# Patient Record
Sex: MALE | Race: WHITE | Employment: OTHER | URBAN - METROPOLITAN AREA
[De-identification: names, ages, dates, MRNs, and addresses within clinical notes are randomized per-mention and may not be internally consistent; named-entity substitution may affect disease eponyms.]

---

## 2021-08-09 ENCOUNTER — HOSPITAL ENCOUNTER (INPATIENT)
Age: 86
LOS: 6 days | Discharge: HOME | DRG: 644 | End: 2021-08-15
Attending: INTERNAL MEDICINE | Admitting: INTERNAL MEDICINE
Payer: MEDICARE

## 2021-08-09 DIAGNOSIS — E87.1 HYPONATREMIA: Primary | ICD-10-CM

## 2021-08-09 PROCEDURE — 2060000000 HC ICU INTERMEDIATE R&B

## 2021-08-09 RX ORDER — ACETAMINOPHEN 325 MG/1
650 TABLET ORAL EVERY 6 HOURS PRN
Status: CANCELLED | OUTPATIENT
Start: 2021-08-09

## 2021-08-09 RX ORDER — FAMOTIDINE 20 MG/1
20 TABLET, FILM COATED ORAL DAILY
COMMUNITY

## 2021-08-09 RX ORDER — ATORVASTATIN CALCIUM 10 MG/1
10 TABLET, FILM COATED ORAL DAILY
COMMUNITY

## 2021-08-09 RX ORDER — SODIUM CHLORIDE 9 MG/ML
25 INJECTION, SOLUTION INTRAVENOUS PRN
Status: DISCONTINUED | OUTPATIENT
Start: 2021-08-09 | End: 2021-08-15 | Stop reason: HOSPADM

## 2021-08-09 RX ORDER — ONDANSETRON 2 MG/ML
4 INJECTION INTRAMUSCULAR; INTRAVENOUS EVERY 6 HOURS PRN
Status: DISCONTINUED | OUTPATIENT
Start: 2021-08-09 | End: 2021-08-15 | Stop reason: HOSPADM

## 2021-08-09 RX ORDER — ATORVASTATIN CALCIUM 40 MG/1
40 TABLET, FILM COATED ORAL NIGHTLY
Status: CANCELLED | OUTPATIENT
Start: 2021-08-09

## 2021-08-09 RX ORDER — CLONAZEPAM 0.5 MG/1
0.5 TABLET ORAL DAILY
COMMUNITY

## 2021-08-09 RX ORDER — TAMSULOSIN HYDROCHLORIDE 0.4 MG/1
0.4 CAPSULE ORAL DAILY
COMMUNITY

## 2021-08-09 RX ORDER — ONDANSETRON 4 MG/1
4 TABLET, ORALLY DISINTEGRATING ORAL EVERY 8 HOURS PRN
Status: CANCELLED | OUTPATIENT
Start: 2021-08-09

## 2021-08-09 RX ORDER — ONDANSETRON 2 MG/ML
4 INJECTION INTRAMUSCULAR; INTRAVENOUS EVERY 6 HOURS PRN
Status: CANCELLED | OUTPATIENT
Start: 2021-08-09

## 2021-08-09 RX ORDER — ACETAMINOPHEN 325 MG/1
650 TABLET ORAL EVERY 6 HOURS PRN
Status: DISCONTINUED | OUTPATIENT
Start: 2021-08-09 | End: 2021-08-15 | Stop reason: HOSPADM

## 2021-08-09 RX ORDER — SODIUM CHLORIDE 0.9 % (FLUSH) 0.9 %
5-40 SYRINGE (ML) INJECTION EVERY 12 HOURS SCHEDULED
Status: CANCELLED | OUTPATIENT
Start: 2021-08-09

## 2021-08-09 RX ORDER — SODIUM CHLORIDE 9 MG/ML
25 INJECTION, SOLUTION INTRAVENOUS PRN
Status: CANCELLED | OUTPATIENT
Start: 2021-08-09

## 2021-08-09 RX ORDER — ACETAMINOPHEN 650 MG/1
650 SUPPOSITORY RECTAL EVERY 6 HOURS PRN
Status: DISCONTINUED | OUTPATIENT
Start: 2021-08-09 | End: 2021-08-15 | Stop reason: HOSPADM

## 2021-08-09 RX ORDER — SODIUM CHLORIDE 0.9 % (FLUSH) 0.9 %
5-40 SYRINGE (ML) INJECTION PRN
Status: DISCONTINUED | OUTPATIENT
Start: 2021-08-09 | End: 2021-08-15 | Stop reason: HOSPADM

## 2021-08-09 RX ORDER — FLECAINIDE ACETATE 100 MG/1
150 TABLET ORAL 2 TIMES DAILY
COMMUNITY

## 2021-08-09 RX ORDER — SODIUM CHLORIDE 0.9 % (FLUSH) 0.9 %
5-40 SYRINGE (ML) INJECTION EVERY 12 HOURS SCHEDULED
Status: DISCONTINUED | OUTPATIENT
Start: 2021-08-09 | End: 2021-08-15 | Stop reason: HOSPADM

## 2021-08-09 RX ORDER — DILTIAZEM HYDROCHLORIDE 120 MG/1
120 CAPSULE, COATED, EXTENDED RELEASE ORAL DAILY
Status: ON HOLD | COMMUNITY
End: 2021-08-15 | Stop reason: SDUPTHER

## 2021-08-09 RX ORDER — SODIUM CHLORIDE 9 MG/ML
INJECTION, SOLUTION INTRAVENOUS CONTINUOUS
Status: DISCONTINUED | OUTPATIENT
Start: 2021-08-09 | End: 2021-08-10

## 2021-08-09 RX ORDER — ASPIRIN 81 MG/1
81 TABLET, CHEWABLE ORAL DAILY
Status: CANCELLED | OUTPATIENT
Start: 2021-08-10

## 2021-08-09 RX ORDER — ONDANSETRON 4 MG/1
4 TABLET, ORALLY DISINTEGRATING ORAL EVERY 8 HOURS PRN
Status: DISCONTINUED | OUTPATIENT
Start: 2021-08-09 | End: 2021-08-15 | Stop reason: HOSPADM

## 2021-08-09 RX ORDER — ACETAMINOPHEN 650 MG/1
650 SUPPOSITORY RECTAL EVERY 6 HOURS PRN
Status: CANCELLED | OUTPATIENT
Start: 2021-08-09

## 2021-08-09 RX ORDER — POLYETHYLENE GLYCOL 3350 17 G/17G
17 POWDER, FOR SOLUTION ORAL DAILY PRN
Status: DISCONTINUED | OUTPATIENT
Start: 2021-08-09 | End: 2021-08-15 | Stop reason: HOSPADM

## 2021-08-09 RX ORDER — SODIUM CHLORIDE 0.9 % (FLUSH) 0.9 %
5-40 SYRINGE (ML) INJECTION PRN
Status: CANCELLED | OUTPATIENT
Start: 2021-08-09

## 2021-08-09 RX ORDER — POLYETHYLENE GLYCOL 3350 17 G/17G
17 POWDER, FOR SOLUTION ORAL DAILY PRN
Status: CANCELLED | OUTPATIENT
Start: 2021-08-09

## 2021-08-09 RX ORDER — MIRTAZAPINE 15 MG/1
15 TABLET, FILM COATED ORAL NIGHTLY
COMMUNITY

## 2021-08-09 RX ORDER — SODIUM CHLORIDE 9 MG/ML
INJECTION, SOLUTION INTRAVENOUS CONTINUOUS
Status: CANCELLED | OUTPATIENT
Start: 2021-08-09

## 2021-08-09 ASSESSMENT — PAIN SCALES - GENERAL: PAINLEVEL_OUTOF10: 0

## 2021-08-10 LAB
ALBUMIN SERPL-MCNC: 3.6 G/DL (ref 3.5–5.2)
ALP BLD-CCNC: 66 U/L (ref 40–129)
ALT SERPL-CCNC: 9 U/L (ref 0–40)
ANION GAP SERPL CALCULATED.3IONS-SCNC: 8 MMOL/L (ref 7–16)
ANION GAP SERPL CALCULATED.3IONS-SCNC: 8 MMOL/L (ref 7–16)
ANION GAP SERPL CALCULATED.3IONS-SCNC: 9 MMOL/L (ref 7–16)
AST SERPL-CCNC: 15 U/L (ref 0–39)
BACTERIA: ABNORMAL /HPF
BASOPHILS ABSOLUTE: 0.01 E9/L (ref 0–0.2)
BASOPHILS RELATIVE PERCENT: 0.2 % (ref 0–2)
BILIRUB SERPL-MCNC: 0.4 MG/DL (ref 0–1.2)
BILIRUBIN URINE: NEGATIVE
BLOOD, URINE: ABNORMAL
BUN BLDV-MCNC: 15 MG/DL (ref 6–23)
BUN BLDV-MCNC: 18 MG/DL (ref 6–23)
BUN BLDV-MCNC: 19 MG/DL (ref 6–23)
CALCIUM SERPL-MCNC: 7.8 MG/DL (ref 8.6–10.2)
CALCIUM SERPL-MCNC: 8.3 MG/DL (ref 8.6–10.2)
CALCIUM SERPL-MCNC: 8.4 MG/DL (ref 8.6–10.2)
CHLORIDE BLD-SCNC: 91 MMOL/L (ref 98–107)
CHLORIDE BLD-SCNC: 94 MMOL/L (ref 98–107)
CHLORIDE BLD-SCNC: 95 MMOL/L (ref 98–107)
CLARITY: CLEAR
CO2: 24 MMOL/L (ref 22–29)
CO2: 25 MMOL/L (ref 22–29)
CO2: 25 MMOL/L (ref 22–29)
COLOR: YELLOW
CREAT SERPL-MCNC: 1.4 MG/DL (ref 0.7–1.2)
CREAT SERPL-MCNC: 1.5 MG/DL (ref 0.7–1.2)
CREAT SERPL-MCNC: 2 MG/DL (ref 0.7–1.2)
EOSINOPHILS ABSOLUTE: 0.07 E9/L (ref 0.05–0.5)
EOSINOPHILS RELATIVE PERCENT: 1.3 % (ref 0–6)
GFR AFRICAN AMERICAN: 38
GFR AFRICAN AMERICAN: 54
GFR AFRICAN AMERICAN: 58
GFR NON-AFRICAN AMERICAN: 32 ML/MIN/1.73
GFR NON-AFRICAN AMERICAN: 44 ML/MIN/1.73
GFR NON-AFRICAN AMERICAN: 48 ML/MIN/1.73
GLUCOSE BLD-MCNC: 104 MG/DL (ref 74–99)
GLUCOSE BLD-MCNC: 89 MG/DL (ref 74–99)
GLUCOSE BLD-MCNC: 93 MG/DL (ref 74–99)
GLUCOSE URINE: NEGATIVE MG/DL
HCT VFR BLD CALC: 30.7 % (ref 37–54)
HEMOGLOBIN: 10.7 G/DL (ref 12.5–16.5)
IMMATURE GRANULOCYTES #: 0.06 E9/L
IMMATURE GRANULOCYTES %: 1.1 % (ref 0–5)
KETONES, URINE: NEGATIVE MG/DL
LEUKOCYTE ESTERASE, URINE: NEGATIVE
LYMPHOCYTES ABSOLUTE: 0.65 E9/L (ref 1.5–4)
LYMPHOCYTES RELATIVE PERCENT: 12.2 % (ref 20–42)
MCH RBC QN AUTO: 29.9 PG (ref 26–35)
MCHC RBC AUTO-ENTMCNC: 34.9 % (ref 32–34.5)
MCV RBC AUTO: 85.8 FL (ref 80–99.9)
MONOCYTES ABSOLUTE: 0.88 E9/L (ref 0.1–0.95)
MONOCYTES RELATIVE PERCENT: 16.5 % (ref 2–12)
NEUTROPHILS ABSOLUTE: 3.67 E9/L (ref 1.8–7.3)
NEUTROPHILS RELATIVE PERCENT: 68.7 % (ref 43–80)
NITRITE, URINE: NEGATIVE
OSMOLALITY URINE: 168 MOSM/KG (ref 300–900)
OSMOLALITY: 267 MOSM/KG (ref 285–310)
PDW BLD-RTO: 11.9 FL (ref 11.5–15)
PH UA: 6 (ref 5–9)
PLATELET # BLD: 171 E9/L (ref 130–450)
PMV BLD AUTO: 8.4 FL (ref 7–12)
POTASSIUM REFLEX MAGNESIUM: 4.3 MMOL/L (ref 3.5–5)
POTASSIUM SERPL-SCNC: 4.5 MMOL/L (ref 3.5–5)
POTASSIUM SERPL-SCNC: 4.7 MMOL/L (ref 3.5–5)
PROTEIN UA: NEGATIVE MG/DL
RBC # BLD: 3.58 E12/L (ref 3.8–5.8)
RBC UA: ABNORMAL /HPF (ref 0–2)
SODIUM BLD-SCNC: 124 MMOL/L (ref 132–146)
SODIUM BLD-SCNC: 127 MMOL/L (ref 132–146)
SODIUM BLD-SCNC: 128 MMOL/L (ref 132–146)
SPECIFIC GRAVITY UA: <=1.005 (ref 1–1.03)
TOTAL PROTEIN: 5.7 G/DL (ref 6.4–8.3)
UROBILINOGEN, URINE: 0.2 E.U./DL
WBC # BLD: 5.3 E9/L (ref 4.5–11.5)
WBC UA: ABNORMAL /HPF (ref 0–5)

## 2021-08-10 PROCEDURE — 81001 URINALYSIS AUTO W/SCOPE: CPT

## 2021-08-10 PROCEDURE — 83930 ASSAY OF BLOOD OSMOLALITY: CPT

## 2021-08-10 PROCEDURE — 80053 COMPREHEN METABOLIC PANEL: CPT

## 2021-08-10 PROCEDURE — 2580000003 HC RX 258: Performed by: INTERNAL MEDICINE

## 2021-08-10 PROCEDURE — 99222 1ST HOSP IP/OBS MODERATE 55: CPT | Performed by: PSYCHIATRY & NEUROLOGY

## 2021-08-10 PROCEDURE — 2060000000 HC ICU INTERMEDIATE R&B

## 2021-08-10 PROCEDURE — 2500000003 HC RX 250 WO HCPCS: Performed by: PHYSICIAN ASSISTANT

## 2021-08-10 PROCEDURE — 6360000002 HC RX W HCPCS: Performed by: PHYSICIAN ASSISTANT

## 2021-08-10 PROCEDURE — 6370000000 HC RX 637 (ALT 250 FOR IP): Performed by: INTERNAL MEDICINE

## 2021-08-10 PROCEDURE — 85025 COMPLETE CBC W/AUTO DIFF WBC: CPT

## 2021-08-10 PROCEDURE — 83935 ASSAY OF URINE OSMOLALITY: CPT

## 2021-08-10 PROCEDURE — 80048 BASIC METABOLIC PNL TOTAL CA: CPT

## 2021-08-10 PROCEDURE — 36415 COLL VENOUS BLD VENIPUNCTURE: CPT

## 2021-08-10 PROCEDURE — 97165 OT EVAL LOW COMPLEX 30 MIN: CPT

## 2021-08-10 PROCEDURE — 97161 PT EVAL LOW COMPLEX 20 MIN: CPT

## 2021-08-10 RX ORDER — HYDRALAZINE HYDROCHLORIDE 20 MG/ML
10 INJECTION INTRAMUSCULAR; INTRAVENOUS EVERY 6 HOURS PRN
Status: DISCONTINUED | OUTPATIENT
Start: 2021-08-10 | End: 2021-08-10

## 2021-08-10 RX ORDER — LABETALOL HYDROCHLORIDE 5 MG/ML
10 INJECTION, SOLUTION INTRAVENOUS EVERY 4 HOURS PRN
Status: DISCONTINUED | OUTPATIENT
Start: 2021-08-10 | End: 2021-08-15 | Stop reason: HOSPADM

## 2021-08-10 RX ORDER — HYDRALAZINE HYDROCHLORIDE 20 MG/ML
10 INJECTION INTRAMUSCULAR; INTRAVENOUS EVERY 4 HOURS PRN
Status: DISCONTINUED | OUTPATIENT
Start: 2021-08-10 | End: 2021-08-11

## 2021-08-10 RX ORDER — ATORVASTATIN CALCIUM 10 MG/1
10 TABLET, FILM COATED ORAL DAILY
Status: DISCONTINUED | OUTPATIENT
Start: 2021-08-10 | End: 2021-08-15 | Stop reason: HOSPADM

## 2021-08-10 RX ORDER — TAMSULOSIN HYDROCHLORIDE 0.4 MG/1
0.4 CAPSULE ORAL DAILY
Status: DISCONTINUED | OUTPATIENT
Start: 2021-08-10 | End: 2021-08-15 | Stop reason: HOSPADM

## 2021-08-10 RX ORDER — DILTIAZEM HYDROCHLORIDE 120 MG/1
120 CAPSULE, COATED, EXTENDED RELEASE ORAL DAILY
Status: DISCONTINUED | OUTPATIENT
Start: 2021-08-10 | End: 2021-08-15 | Stop reason: HOSPADM

## 2021-08-10 RX ORDER — DEXTROSE MONOHYDRATE 50 MG/ML
INJECTION, SOLUTION INTRAVENOUS CONTINUOUS
Status: DISCONTINUED | OUTPATIENT
Start: 2021-08-10 | End: 2021-08-11

## 2021-08-10 RX ORDER — FAMOTIDINE 20 MG/1
20 TABLET, FILM COATED ORAL DAILY
Status: DISCONTINUED | OUTPATIENT
Start: 2021-08-10 | End: 2021-08-15 | Stop reason: HOSPADM

## 2021-08-10 RX ADMIN — APIXABAN 5 MG: 5 TABLET, FILM COATED ORAL at 11:46

## 2021-08-10 RX ADMIN — FLECAINIDE ACETATE 150 MG: 100 TABLET ORAL at 19:54

## 2021-08-10 RX ADMIN — ATORVASTATIN CALCIUM 10 MG: 10 TABLET, FILM COATED ORAL at 19:54

## 2021-08-10 RX ADMIN — APIXABAN 5 MG: 5 TABLET, FILM COATED ORAL at 19:54

## 2021-08-10 RX ADMIN — SODIUM CHLORIDE, PRESERVATIVE FREE 10 ML: 5 INJECTION INTRAVENOUS at 19:54

## 2021-08-10 RX ADMIN — SODIUM CHLORIDE: 9 INJECTION, SOLUTION INTRAVENOUS at 00:56

## 2021-08-10 RX ADMIN — FAMOTIDINE 20 MG: 20 TABLET ORAL at 11:46

## 2021-08-10 RX ADMIN — Medication 10 ML: at 17:13

## 2021-08-10 RX ADMIN — DILTIAZEM HYDROCHLORIDE 120 MG: 120 CAPSULE, COATED, EXTENDED RELEASE ORAL at 11:45

## 2021-08-10 RX ADMIN — LABETALOL HYDROCHLORIDE 10 MG: 5 INJECTION INTRAVENOUS at 21:43

## 2021-08-10 RX ADMIN — DEXTROSE MONOHYDRATE: 50 INJECTION, SOLUTION INTRAVENOUS at 10:29

## 2021-08-10 RX ADMIN — HYDRALAZINE HYDROCHLORIDE 10 MG: 20 INJECTION, SOLUTION INTRAMUSCULAR; INTRAVENOUS at 20:19

## 2021-08-10 RX ADMIN — FLECAINIDE ACETATE 150 MG: 100 TABLET ORAL at 11:47

## 2021-08-10 RX ADMIN — HYDRALAZINE HYDROCHLORIDE 10 MG: 20 INJECTION INTRAMUSCULAR; INTRAVENOUS at 23:53

## 2021-08-10 ASSESSMENT — PAIN SCALES - GENERAL
PAINLEVEL_OUTOF10: 0

## 2021-08-10 NOTE — PROGRESS NOTES
This patient is severely hyponatremic  Admission labs were not done  Apparently he is a difficult stick  Discussed with nursing - they are working on it now

## 2021-08-10 NOTE — CONSULTS
NEUROLOGY CONSULT NOTE       Requesting Physician: Anai Carlos MD     Reason for Consult:  Evaluate for \"possible CVA per outside hospital\"      IMPRESSION/PLAN:  Suspect patient's difficulties were all due to hyponatremia. Infarct on CT is likely old or an incidental finding. We, unfortunately, cannot find his records from [place], so I actually cannot review that CT, but he is going to get an MRI here and that will give us a better idea if this is a old or new stroke. Also depending on location, he may or may not need other work-up. Will follow up on the MRI results and make further plan at that time. History of Present Illness:  Jennifer Calvin is a 80 y.o. male admitted to [facility] on 8/9/2021. The patient was fine a few weeks back, even golfing, but more recently has been feeling generally weak and somewhat off balance. He was caring a box and a basket of peaches up some steps and missed a step and fell, injuring his right arm. Confusion as mentioned but the patient does not think he was confused. He saw his primary care doctor and I do not know if any labs were done as an outpatient. He went to [place] [facility] where he was found to have a sodium of 121. There was a possible basal ganglia stroke on CT and he was transferred here for further evaluation and treatment. Unfortunately we do not have his records from [facility].  I am sure they are floating around somewhere but the nurses have not seen them all day. Past Medical History:        Diagnosis Date    Atrial fibrillation ([address]     Hypertension     Pacemaker        No past surgical history on file. Social History:  Social History     Tobacco Use   Smoking Status Not on file     Social History     Substance and Sexual Activity   Alcohol Use None     Social History     Substance and Sexual Activity   Drug Use Not on file         Family History:   No family history on file.     Review of Systems:  CONSTITUTIONAL: negative for fever or recent illness  EYE: He has some difficulty describing visual changes recently. May be catching something out of the corner of his eye like a flash. He had a hard time better explaining this. ENT:  negative for dysphagia  RESPIRATORY:  negative for dyspnea  CARDIOVASCULAR:  negative for chest pain  GASTROINTESTINAL:  negative for nausea  HEMATOLOGIC/LYMPHATIC:  negative for unusual bleeding  MUSCULOSKELETAL: Right arm pain from a fall in arms had swollen up  PSYCH: He states he is on Remeron to help him sleep  SKIN: Considerable bruising of the right arm where he fell  GENITOURINARY: negative for dysuria  NEUROLOGIC: The patient feels generally weak. May be a little weaker on the left than the right. Allergies:    No Known Allergies     Current Medications:   dextrose 5 % solution, Continuous  apixaban (ELIQUIS) tablet 5 mg, BID  atorvastatin (LIPITOR) tablet 10 mg, Daily  dilTIAZem (CARDIZEM CD) extended release capsule 120 mg, Daily  famotidine (PEPCID) tablet 20 mg, Daily  flecainide (TAMBOCOR) tablet 150 mg, BID  tamsulosin (FLOMAX) capsule 0.4 mg, Daily  sodium chloride flush 0.9 % injection 5-40 mL, 2 times per day  sodium chloride flush 0.9 % injection 5-40 mL, PRN  0.9 % sodium chloride infusion, PRN  ondansetron (ZOFRAN-ODT) disintegrating tablet 4 mg, Q8H PRN   Or  ondansetron (ZOFRAN) injection 4 mg, Q6H PRN  polyethylene glycol (GLYCOLAX) packet 17 g, Daily PRN  acetaminophen (TYLENOL) tablet 650 mg, Q6H PRN   Or  acetaminophen (TYLENOL) suppository 650 mg, Q6H PRN       Medications Prior to Admission: atorvastatin (LIPITOR) 10 MG tablet, Take 10 mg by mouth daily  dilTIAZem (CARDIZEM CD) 120 MG extended release capsule, Take 120 mg by mouth daily  flecainide (TAMBOCOR) 100 MG tablet, Take 150 mg by mouth 2 times daily  mirtazapine (REMERON) 15 MG tablet, Take 15 mg by mouth nightly  clonazePAM (KLONOPIN) 0.5 MG tablet, Take 0.5 mg by mouth daily.   apixaban (ELIQUIS) 5 MG TABS tablet, Take 5 mg by mouth 2 times daily  famotidine (PEPCID) 20 MG tablet, Take 20 mg by mouth daily  tamsulosin (FLOMAX) 0.4 MG capsule, Take 0.4 mg by mouth daily    Physical Exam:  BP (!) 160/98   Pulse 74   Temp 98.2 °F (36.8 °C) (Oral)   Resp 18   Ht 6' (1.829 m)   Wt 212 lb 4.8 oz (96.3 kg)   SpO2 97%   BMI 28.79 kg/m²  I Body mass index is 28.79 kg/m². I   Wt Readings from Last 1 Encounters:   08/09/21 212 lb 4.8 oz (96.3 kg)        GENERAL: he is in no apparent distress; seems younger than stated age  EYE:  Fundi not examined due to the Covid-19 outbreak  CARDIOVASCULAR:  Heart regular rate and rhythm. Carotids not well auscultated due to patient talking  NEUROLOGIC:  Level of Alertness: alert  Orientation: oriented to person, place and time  Memory and Fund of Knowledge:  Normal; he is able to relate current events in great detail  Attention/Concentration: normal  Language: no aphasia  Cranial Nerves: pupils are equal; extraocular muscles intact; facial strength and sensation are intact; hearing is intact to soft voice; the palate raises midline, and the tongue protrudes midline; shoulder shrug is symmetric  Motor Exam: Normal tone in all extremities. No pronator drift. Strength is MRC grade 5 in all extremities bilaterally.   Sensory: Sensory symmetric to light touch  Coordination: Cerebellar function is intact for the nose-finger-nose and heel-to-shin maneuvers   Deep Tendon Reflexes: +1/4  Plantar Responses:  Downgoing  Abnormal movements: none  Station and gait: I did not get him up due  time constraints but he was able to walk with physical therapy with the standby assistance    Diagnostics:  CBC:   Lab Results   Component Value Date    WBC 5.3 08/10/2021    RBC 3.58 08/10/2021    HGB 10.7 08/10/2021    HCT 30.7 08/10/2021    MCV 85.8 08/10/2021    MCH 29.9 08/10/2021    MCHC 34.9 08/10/2021    RDW 11.9 08/10/2021     08/10/2021    MPV 8.4 08/10/2021     CMP:    Lab Results Component Value Date     08/10/2021    K 4.7 08/10/2021    K 4.3 08/10/2021    CL 94 08/10/2021    CO2 24 08/10/2021    BUN 18 08/10/2021    CREATININE 1.5 08/10/2021    GFRAA 54 08/10/2021    LABGLOM 44 08/10/2021    GLUCOSE 89 08/10/2021    PROT 5.7 08/10/2021    LABALBU 3.6 08/10/2021    CALCIUM 8.3 08/10/2021    BILITOT 0.4 08/10/2021    ALKPHOS 66 08/10/2021    AST 15 08/10/2021    ALT 9 08/10/2021         Electronically signed by Leonard Rincon DO on 8/10/2021 at 5:07 PM

## 2021-08-10 NOTE — H&P
[address] [facility]  History and Physical      CHIEF COMPLAINT:    Altered mental status     Patient of No primary care provider on file. presents with:  Altered mental status    History of Present Illness:   Patient is a 80year old male of Dr. Lena Biggs with past medical history of hypertension, atrial fibrillation, and status post pacemaker placement who presented to [facility] with altered mental status. Patient states he has been dizzy and confused for the past 2 to 4 weeks. Patient's daughter states that they have taken patient to PCPs office multiple times and work-up has been negative. CT head at [facility] showed questionable basal ganglia CVA. Patient found to be hyponatremic with sodium level of 121. Patient transferred to [facility] due to bed availability. Upon examination, patient is resting in bed with his daughter at bedside. Patient states that until 2 to 4 weeks ago he was in his normal state of health which included independence with all ADLs, golfing, and doing yard work. Patient states he is feeling much better today and back to baseline. REVIEW OF SYSTEMS:  Pertinent negatives are above in HPI. 10 point ROS otherwise negative. Past Medical History:   Diagnosis Date    Atrial fibrillation ([address]     Hypertension     Pacemaker          No past surgical history on file. Medications Prior to Admission:    Medications Prior to Admission: atorvastatin (LIPITOR) 10 MG tablet, Take 10 mg by mouth daily  dilTIAZem (CARDIZEM CD) 120 MG extended release capsule, Take 120 mg by mouth daily  flecainide (TAMBOCOR) 100 MG tablet, Take 150 mg by mouth 2 times daily  mirtazapine (REMERON) 15 MG tablet, Take 15 mg by mouth nightly  clonazePAM (KLONOPIN) 0.5 MG tablet, Take 0.5 mg by mouth daily.   apixaban (ELIQUIS) 5 MG TABS tablet, Take 5 mg by mouth 2 times daily  famotidine (PEPCID) 20 MG tablet, Take 20 mg by mouth daily  tamsulosin (FLOMAX) 0.4 MG capsule, 66 08/10/2021    AST 15 08/10/2021    ALT 9 08/10/2021         Telemetry:  I've personally reviewed the patient's telemetry:  Atrial paced    ASSESSMENT/PLAN:  Active Problems:    Hyponatremia  Resolved Problems:    * No resolved hospital problems. *    IV fluids  Monitor labs, BMP scheduled every 6 hours  Continue home meds  Monitor blood pressure  Hydralazine PRN  Supportive care    Medication for other comorbidities continue as appropriate dose adjustment as necessary. DVT prophylaxis  PT OT  Discharge planning  Case discussed with attending and agreed upon plan of care. Code status: Full  Requires inpatient level of care  BERNARDINO Fowler CNP    2:28 PM  8/10/2021       Currently completely asymptomatic and feels well  Na raysa to quickly and now is decreasing with d5 w  Will consult renal to mange hyponatremia       I personally saw, examined and provided care for the patient. Radiographs, labs and medication list were reviewed by me independently. The case was discussed in detail and plans for care were established. Review of BERNARDINO Fowler CNP, documentation was conducted and revisions were made as appropriate directly by me. I agree with the above documented exam, problem list, and plan of care.      Peterson Keen MD  9:51 PM  8/10/2021

## 2021-08-10 NOTE — PROGRESS NOTES
Labs took 8+ hours to come back. Eventually came back showing Na 128 up from 121 yesterday evening. Goal should be closer to 6 meq/L/day. Will switch fluids to D5W and start Q6H BMP to push sodium down or at least stabilize for rest of the day.

## 2021-08-10 NOTE — PROGRESS NOTES
Left message with Dr. Trina Webb regarding Na 128 and am labs, /90 and pt will need home meds ordered.  Mukesh Cortez RN

## 2021-08-10 NOTE — PROGRESS NOTES
Physical Therapy    Facility/Department: [address] INTERNAL MEDICINE 2  Initial Assessment    NAME: Arnoldo Wilson  : 1934  MRN: [id number]    Date of Service: 8/10/2021      Patient Diagnosis(es): There were no encounter diagnoses. has no past medical history on file. has no past surgical history on file. Evaluating Therapist: Elinor Canales PT      Room #:  8472/4550-  Diagnosis:  Hyponatremia [E87.1]   Questionable basal ganglia CVA  Precautions:  falls      Social:  Pt lives with wife in a 1 floor plan 3 steps to family room  Prior to admission independent without device     Initial Evaluation  Date: 8/10/21 Treatment      Short Term/ Long Term   Goals   Was pt agreeable to Eval/treatment? yes     Does pt have pain? No c/o pain     Bed Mobility  Rolling: independent  Supine to sit: independent  Sit to supine: NT  Scooting: independent  independnent   Transfers Sit to stand: SBA  Stand to sit: SBA  Stand pivot: SBA  independent   Ambulation    150 feet with no device with SBA  200 feet with no device independent   Stair Negotiation  Ascended and descended  NT   3 steps with 1 rail with supervision   LE strength     4-/5    4/5   balance      Fair+     AM-PAC Raw score                20/24         Pt is alert and grossly Oriented   LE ROM: WFL  Sensation: intact  Edema: none  Endurance: good       ASSESSMENT:    Pt displays functional ability as noted in the objective portion of this evaluation. Patient education  Pt educated on PT objectives    Patient response to education:   Pt verbalized understanding Pt demonstrated skill Pt requires further education in this area   yes           Comments:  No loss of balance or c/o dizziness during ambulation. Cues for attention to IV line    Pt's/ family goals   1.  To return home    Conditions Requiring Skilled Therapeutic Intervention:    [x]Decreased strength     []Decreased ROM  [x]Decreased functional mobility  [x]Decreased balance   []Decreased endurance   []Decreased posture  []Decreased sensation  []Decreased coordination   []Decreased vision  []Decreased safety awareness   []Increased pain       Patient and or family understand(s) diagnosis, prognosis, and plan of care. Prognosis is good for reaching above PT goals    PHYSICAL THERAPY PLAN OF CARE:    PT POC is established based on physician order and patient diagnosis     Referring provider/PT Order: Sarah Parisi MD/ PT eval and treat      Current Treatment Recommendations:     [x] Strengthening to improve independence with functional mobility   [] ROM to improve independence with functional mobility   [x] Balance Training to improve static/dynamic balance and to reduce fall risk  [x] Endurance Training to improve activity tolerance during functional mobility   [x] Transfer Training to improve safety and independence with all functional transfers   [x] Gait Training to improve gait mechanics, endurance and assess need for appropriate assistive device  [x] Stair Training in preparation for safe discharge home and/or into the community   [] Positioning to prevent skin breakdown and contractures  [x] Safety and Education Training   [x] Patient/Caregiver Education   [] HEP  [] Other     PT long term treatment goals are located in above grid    Frequency of treatments: 2-5x/week x 5 days. Time in  1040  Time out  1055        Evaluation Time includes thorough review of current medical information, gathering information on past medical history/social history and prior level of function, completion of standardized testing/informal observation of tasks, assessment of data and education on plan of care and goals.       CPT codes:  [x] Low Complexity PT evaluation 15746  [] Moderate Complexity PT evaluation 91994  [] High Complexity PT evaluation 99664  [] PT Re-evaluation 50607  [] Gait training 60989 minutes  [] Manual therapy 87623 minutes  [] Therapeutic activities 51793 minutes  [] Therapeutic exercises 50103 minutes  [] Neuromuscular reeducation 77374 minutes     [facility] PT [id number]

## 2021-08-10 NOTE — PROGRESS NOTES
Pt direct admit from [employer]. He has home medications in room with him, advised him not to take his own medications that we would provide them for him. Pt is AxO x4, he arrived with a skin tear that was covered with mepilex, patient uncomfortable with removing at this time.

## 2021-08-10 NOTE — PROGRESS NOTES
Occupational Therapy  OCCUPATIONAL THERAPY INITIAL EVALUATION  [facility] [address]    Date: 8/10/2021     Patient Name: Zoie Wallace  MRN: [id number]  : 1934  Room: [address] [place] [address]    Evaluating OT: Mariluz Mohamud, OTR/L - [id number]    Referring Provider: Anjana Nath MD; Charlanne Bence, APRN - CNP  Specific Provider Orders/Date: \"OT eval and treat\" - 2021 and 8/10/2021    Diagnosis: Hyponatremia [E87.1]      Pertinent Medical History: No past medical history on file. Precautions: fall risk    Assessment of Current Deficits:    [x] Functional mobility   [x]ADLs  [x] Strength               [x]Cognition   [x] Functional transfers   [x] IADLs         [x] Safety Awareness   [x]Endurance   [] Fine Coordination              [x] Balance      [] Vision/perception   [x]Sensation    []Gross Motor Coordination  [] ROM  [] Delirium                   [] Motor Control     OT PLAN OF CARE   OT POC is based on physician orders, patient diagnosis, and results of clinical assessment.   Frequency/Duration 2-5 days/week for 2 weeks PRN   Specific OT Treatment Interventions to Include:   * Instruction/training on adapted ADL techniques and AE recommendations to increase functional independence within precautions       * Training on energy conservation strategies, correct breathing pattern and techniques to improve independence/tolerance for self-care routine  * Functional transfer/mobility training/DME recommendations for increased independence, safety, and fall prevention  * Patient/Family education to increase follow through with safety techniques and functional independence  * Recommendation of environmental modifications for increased safety with functional transfers/mobility and ADLs  * Cognitive retraining/development of therapeutic activities to improve problem solving, judgement, memory, and attention for increased safety/participation in ADL/IADL tasks  * Therapeutic exercise to improve motor endurance, ROM, and functional strength for ADLs/functional transfers  * Therapeutic activities to facilitate/challenge dynamic balance, stand tolerance for increased safety and independence with ADLs  * Neuro-muscular re-education: facilitation of righting/equilibrium reactions, midline orientation, scapular stability/mobility, normalization of muscle tone, and facilitation of volitional active controled movement    Recommended Adaptive Equipment: TBD    Home Living: Patient lives with his wife in a one-floor home (three steps down to access family room area with TV and computer). Bathroom Setup: tub shower (no seat, no grab bars)     Prior Level of Function (PLOF): Patient reported that he was independent with ADLs, some IADLs, and functional mobility prior to this hospitalization. Driving: Yes    Pain Level: Patient denied experiencing pain . Cognition: Patient alert and oriented grossly. WFL command follow demonstrated. Decreased insight to current abilities/limitations demonstrated. Memory: WFL grossly  Sequencing: WFL grossly  Problem Solving: Fair  Judgement/Safety: Fair   Impulsivity demonstrated. Functional Assessment:  -PAC Daily Activity Raw Score: 19/24   Initial Eval Status  Date: 8/10/2021 Treatment Status  Date:  Short Term Goals = Long Term Goals   Feeding Independent  N/A   Grooming SBA for hand hygiene and hair combing while standing at sinkside.   Mod I / Arthur [place]  (seated/standing at sink)   UB Dressing Setup  Independent  (including item retrieval)   LB Dressing Min A  Mod I / Independent - with use of AE, as needed/appropriate   Bathing Min A  Mod I / Independent - with use of AE/DME, as needed/appropriate   Toileting CGA  Mod I / Independent   Bed Mobility  Supine-to-Sit: Independent     Functional Transfers Sit-to-Stand: SBA   from EOB  Independent   Functional Mobility SBA   (without device) for household+ distance within patient's room, bathroom, and hallway. Mod I / Independent with functional mobility (with device, as needed/appropriate) in order to maximize independence with ADLs/IADLs and other functional tasks. Balance Sitting: Good  (at EOB)  Standing: Fair+  (without device)  Good dynamic standing balance during completion of ADLs/IADLs and other functional tasks. Activity Tolerance Fair+  Patient will demonstrate Good understanding and consistent implementation of energy conservation techniques and work simplification techniques into ADL/IADL routines. Visual/  Perceptual WFL     N/A   B UE Strength 4-/5 grossly  Patient will demonstrate 4+/5 B UE strength in order to maximize independence with ADLs and functional transfers. Additional Long-Term Goal: Patient will increase functional independence to PLOF in order to allow patient to live in least restrictive environment. ROM: Additional Information:    R UE  WFL    L UE WFL      Hearing: WFL  Sensation: Patient denied experiencing numbness/tingling in B UEs. Tone: WFL  Edema: No    Comments: RN approved patient's participation in [address] [place] [address] activities. Upon arrival, patient supine in bed. At end of session, patient seated in bedside chair with call light and phone within reach and all lines and tubes intact. Patient would benefit from continued skilled OT to increase safety and independence with completion of ADL/IADL tasks for functional independence and quality of life. Rehab Potential: Good for established goals. Patient / Family Goal: No goal stated. Patient and/or family were instructed on functional diagnosis, prognosis/goals, and OT plan of care. Demonstrated Fair understanding.     Eval Complexity: Low    Time In: 1040  Time Out: 1055  Total Treatment Time: 0 minutes      Minutes Units   OT Eval Low 86045 15 1   OT Eval Medium 35696     OT Eval High 46593     OT Re-Eval [id number]     Therapeutic Ex 05076     Therapeutic Activities 27948     ADL/Self Care 29254     Orthotic Management 01880     Neuro Re-Ed 21447     Non-Billable Time N/A ---     Evaluation time includes thorough review of current medical information, gathering information on past medical history/social history and prior level of function, completion of standardized testing/informal observation of tasks, assessment of data, and education on plan of care and goals. Johanne Campos, OTR/L  License Number: [id number]

## 2021-08-10 NOTE — PROGRESS NOTES
Transfer from [facility] for frequent falls. AVSS except mildly hypertensive. Neuro exam nonfocal per [facility] ED PA. Exam reportedly unremarkable. Orthostatics not done. Found to have significant hyponatremia Na 121. Reportedly not on diuretics. Creatinine reportedly at baseline ~1.5. CT head apparently shows questionable basal ganglia CVA chronicity unclear. [facility] has no beds, so they are transferring to us.

## 2021-08-10 NOTE — PROGRESS NOTES
I briefly thought [facility] didn't have Neuro and asked [facility] to send patient to [place] instead  Almost immediately after hanging up, I discovered the [facility] in fact does have neuro  I made 5 separate attempts to call Stacie Madrigal back immediately to let them know that the patient CAN still go to [facility]  All phone calls were unsuccessful. I was left on hold interminably.   I spoke directly to [facility] ED to notify them of this issue, and that transfer to [facility] is STILL THE PLAN  I simply cannot reach the Stacie Madrigal anymore

## 2021-08-10 NOTE — CARE COORDINATION
Met w/ patient. Explained role of  and plan of care. Lives w/ wife in a 1 story house- 3 steps to entrance. Has cane. Independent PTA. Drives. PCP is Dr. Gerard Bunch and pharmacy is [facility]. PT am-pac 20. States frequent falls at home. Neuro to see. For MRI brain. Monitoring BMP q6hr. Plan remains to return home on discharge.  Will follow  Rosendo Sheets RN case manager

## 2021-08-11 ENCOUNTER — APPOINTMENT (OUTPATIENT)
Dept: CT IMAGING | Age: 86
DRG: 644 | End: 2021-08-11
Attending: INTERNAL MEDICINE
Payer: MEDICARE

## 2021-08-11 LAB
ALBUMIN SERPL-MCNC: 3.9 G/DL (ref 3.5–5.2)
ALP BLD-CCNC: 69 U/L (ref 40–129)
ALT SERPL-CCNC: 11 U/L (ref 0–40)
ANION GAP SERPL CALCULATED.3IONS-SCNC: 10 MMOL/L (ref 7–16)
ANION GAP SERPL CALCULATED.3IONS-SCNC: 11 MMOL/L (ref 7–16)
ANION GAP SERPL CALCULATED.3IONS-SCNC: 9 MMOL/L (ref 7–16)
AST SERPL-CCNC: 17 U/L (ref 0–39)
BASOPHILS ABSOLUTE: 0.02 E9/L (ref 0–0.2)
BASOPHILS RELATIVE PERCENT: 0.3 % (ref 0–2)
BILIRUB SERPL-MCNC: 0.5 MG/DL (ref 0–1.2)
BUN BLDV-MCNC: 20 MG/DL (ref 6–23)
BUN BLDV-MCNC: 21 MG/DL (ref 6–23)
BUN BLDV-MCNC: 25 MG/DL (ref 6–23)
CALCIUM SERPL-MCNC: 8.4 MG/DL (ref 8.6–10.2)
CALCIUM SERPL-MCNC: 8.9 MG/DL (ref 8.6–10.2)
CALCIUM SERPL-MCNC: 8.9 MG/DL (ref 8.6–10.2)
CHLORIDE BLD-SCNC: 93 MMOL/L (ref 98–107)
CHLORIDE URINE RANDOM: 41 MMOL/L
CO2: 22 MMOL/L (ref 22–29)
CO2: 24 MMOL/L (ref 22–29)
CO2: 25 MMOL/L (ref 22–29)
CREAT SERPL-MCNC: 1.7 MG/DL (ref 0.7–1.2)
CREATININE URINE: 213 MG/DL (ref 40–278)
CREATININE URINE: 214 MG/DL (ref 40–278)
EOSINOPHILS ABSOLUTE: 0.15 E9/L (ref 0.05–0.5)
EOSINOPHILS RELATIVE PERCENT: 2.2 % (ref 0–6)
GFR AFRICAN AMERICAN: 46
GFR NON-AFRICAN AMERICAN: 38 ML/MIN/1.73
GLUCOSE BLD-MCNC: 109 MG/DL (ref 74–99)
GLUCOSE BLD-MCNC: 110 MG/DL (ref 74–99)
GLUCOSE BLD-MCNC: 94 MG/DL (ref 74–99)
HCT VFR BLD CALC: 33.6 % (ref 37–54)
HEMOGLOBIN: 11.5 G/DL (ref 12.5–16.5)
IMMATURE GRANULOCYTES #: 0.06 E9/L
IMMATURE GRANULOCYTES %: 0.9 % (ref 0–5)
LYMPHOCYTES ABSOLUTE: 0.68 E9/L (ref 1.5–4)
LYMPHOCYTES RELATIVE PERCENT: 9.9 % (ref 20–42)
MCH RBC QN AUTO: 29.6 PG (ref 26–35)
MCHC RBC AUTO-ENTMCNC: 34.2 % (ref 32–34.5)
MCV RBC AUTO: 86.6 FL (ref 80–99.9)
MICROALBUMIN UR-MCNC: 45.1 MG/L
MICROALBUMIN/CREAT UR-RTO: 21.1 (ref 0–30)
MONOCYTES ABSOLUTE: 1.05 E9/L (ref 0.1–0.95)
MONOCYTES RELATIVE PERCENT: 15.3 % (ref 2–12)
NEUTROPHILS ABSOLUTE: 4.91 E9/L (ref 1.8–7.3)
NEUTROPHILS RELATIVE PERCENT: 71.4 % (ref 43–80)
OSMOLALITY URINE: 473 MOSM/KG (ref 300–900)
OSMOLALITY: 266 MOSM/KG (ref 285–310)
PDW BLD-RTO: 12.1 FL (ref 11.5–15)
PLATELET # BLD: 178 E9/L (ref 130–450)
PMV BLD AUTO: 8.2 FL (ref 7–12)
POTASSIUM SERPL-SCNC: 4.2 MMOL/L (ref 3.5–5)
POTASSIUM SERPL-SCNC: 4.4 MMOL/L (ref 3.5–5)
POTASSIUM SERPL-SCNC: 4.6 MMOL/L (ref 3.5–5)
POTASSIUM, UR: 60.5 MMOL/L
PROTEIN PROTEIN: 17 MG/DL (ref 0–12)
PROTEIN/CREAT RATIO: 0.1
PROTEIN/CREAT RATIO: 0.1 (ref 0–0.2)
RBC # BLD: 3.88 E12/L (ref 3.8–5.8)
SODIUM BLD-SCNC: 126 MMOL/L (ref 132–146)
SODIUM BLD-SCNC: 127 MMOL/L (ref 132–146)
SODIUM BLD-SCNC: 127 MMOL/L (ref 132–146)
SODIUM URINE: 51 MMOL/L
TOTAL PROTEIN: 6.2 G/DL (ref 6.4–8.3)
WBC # BLD: 6.9 E9/L (ref 4.5–11.5)

## 2021-08-11 PROCEDURE — 6370000000 HC RX 637 (ALT 250 FOR IP): Performed by: NURSE PRACTITIONER

## 2021-08-11 PROCEDURE — 6370000000 HC RX 637 (ALT 250 FOR IP): Performed by: INTERNAL MEDICINE

## 2021-08-11 PROCEDURE — 6360000002 HC RX W HCPCS

## 2021-08-11 PROCEDURE — 2580000003 HC RX 258: Performed by: INTERNAL MEDICINE

## 2021-08-11 PROCEDURE — 83930 ASSAY OF BLOOD OSMOLALITY: CPT

## 2021-08-11 PROCEDURE — 85025 COMPLETE CBC W/AUTO DIFF WBC: CPT

## 2021-08-11 PROCEDURE — 51798 US URINE CAPACITY MEASURE: CPT

## 2021-08-11 PROCEDURE — 80048 BASIC METABOLIC PNL TOTAL CA: CPT

## 2021-08-11 PROCEDURE — 2060000000 HC ICU INTERMEDIATE R&B

## 2021-08-11 PROCEDURE — 82436 ASSAY OF URINE CHLORIDE: CPT

## 2021-08-11 PROCEDURE — 84300 ASSAY OF URINE SODIUM: CPT

## 2021-08-11 PROCEDURE — 82570 ASSAY OF URINE CREATININE: CPT

## 2021-08-11 PROCEDURE — 80053 COMPREHEN METABOLIC PANEL: CPT

## 2021-08-11 PROCEDURE — 6360000002 HC RX W HCPCS: Performed by: FAMILY MEDICINE

## 2021-08-11 PROCEDURE — 36415 COLL VENOUS BLD VENIPUNCTURE: CPT

## 2021-08-11 PROCEDURE — 97535 SELF CARE MNGMENT TRAINING: CPT

## 2021-08-11 PROCEDURE — 83935 ASSAY OF URINE OSMOLALITY: CPT

## 2021-08-11 PROCEDURE — 84133 ASSAY OF URINE POTASSIUM: CPT

## 2021-08-11 PROCEDURE — 82044 UR ALBUMIN SEMIQUANTITATIVE: CPT

## 2021-08-11 PROCEDURE — 70450 CT HEAD/BRAIN W/O DYE: CPT

## 2021-08-11 PROCEDURE — 99231 SBSQ HOSP IP/OBS SF/LOW 25: CPT | Performed by: PSYCHIATRY & NEUROLOGY

## 2021-08-11 PROCEDURE — 2500000003 HC RX 250 WO HCPCS: Performed by: PHYSICIAN ASSISTANT

## 2021-08-11 PROCEDURE — 84156 ASSAY OF PROTEIN URINE: CPT

## 2021-08-11 RX ORDER — LORAZEPAM 2 MG/ML
0.5 INJECTION INTRAMUSCULAR
Status: ACTIVE | OUTPATIENT
Start: 2021-08-11 | End: 2021-08-11

## 2021-08-11 RX ORDER — HYDRALAZINE HYDROCHLORIDE 20 MG/ML
10 INJECTION INTRAMUSCULAR; INTRAVENOUS
Status: DISCONTINUED | OUTPATIENT
Start: 2021-08-11 | End: 2021-08-11

## 2021-08-11 RX ORDER — HYDRALAZINE HYDROCHLORIDE 20 MG/ML
10 INJECTION INTRAMUSCULAR; INTRAVENOUS ONCE
Status: COMPLETED | OUTPATIENT
Start: 2021-08-11 | End: 2021-08-11

## 2021-08-11 RX ORDER — HYDRALAZINE HYDROCHLORIDE 20 MG/ML
INJECTION INTRAMUSCULAR; INTRAVENOUS
Status: COMPLETED
Start: 2021-08-11 | End: 2021-08-11

## 2021-08-11 RX ORDER — DOXAZOSIN 2 MG/1
2 TABLET ORAL DAILY
Status: DISCONTINUED | OUTPATIENT
Start: 2021-08-11 | End: 2021-08-12

## 2021-08-11 RX ORDER — HYDRALAZINE HYDROCHLORIDE 20 MG/ML
10 INJECTION INTRAMUSCULAR; INTRAVENOUS EVERY 6 HOURS PRN
Status: DISCONTINUED | OUTPATIENT
Start: 2021-08-11 | End: 2021-08-15 | Stop reason: HOSPADM

## 2021-08-11 RX ADMIN — HYDRALAZINE HYDROCHLORIDE 10 MG: 20 INJECTION INTRAMUSCULAR; INTRAVENOUS at 20:40

## 2021-08-11 RX ADMIN — LABETALOL HYDROCHLORIDE 10 MG: 5 INJECTION INTRAVENOUS at 02:16

## 2021-08-11 RX ADMIN — FLECAINIDE ACETATE 150 MG: 100 TABLET ORAL at 20:40

## 2021-08-11 RX ADMIN — HYDRALAZINE HYDROCHLORIDE 10 MG: 20 INJECTION INTRAMUSCULAR; INTRAVENOUS at 02:31

## 2021-08-11 RX ADMIN — APIXABAN 5 MG: 5 TABLET, FILM COATED ORAL at 09:06

## 2021-08-11 RX ADMIN — TAMSULOSIN HYDROCHLORIDE 0.4 MG: 0.4 CAPSULE ORAL at 09:06

## 2021-08-11 RX ADMIN — DOXAZOSIN 2 MG: 2 TABLET ORAL at 12:04

## 2021-08-11 RX ADMIN — SODIUM CHLORIDE, PRESERVATIVE FREE 10 ML: 5 INJECTION INTRAVENOUS at 20:40

## 2021-08-11 RX ADMIN — ACETAMINOPHEN 650 MG: 325 TABLET ORAL at 02:33

## 2021-08-11 RX ADMIN — LABETALOL HYDROCHLORIDE 10 MG: 5 INJECTION INTRAVENOUS at 20:54

## 2021-08-11 RX ADMIN — FLECAINIDE ACETATE 150 MG: 100 TABLET ORAL at 09:06

## 2021-08-11 RX ADMIN — ATORVASTATIN CALCIUM 10 MG: 10 TABLET, FILM COATED ORAL at 09:06

## 2021-08-11 RX ADMIN — DILTIAZEM HYDROCHLORIDE 120 MG: 120 CAPSULE, COATED, EXTENDED RELEASE ORAL at 09:06

## 2021-08-11 RX ADMIN — SODIUM CHLORIDE, PRESERVATIVE FREE 10 ML: 5 INJECTION INTRAVENOUS at 09:06

## 2021-08-11 RX ADMIN — FAMOTIDINE 20 MG: 20 TABLET ORAL at 09:06

## 2021-08-11 RX ADMIN — HYDRALAZINE HYDROCHLORIDE 10 MG: 20 INJECTION, SOLUTION INTRAMUSCULAR; INTRAVENOUS at 02:31

## 2021-08-11 RX ADMIN — APIXABAN 5 MG: 5 TABLET, FILM COATED ORAL at 20:40

## 2021-08-11 ASSESSMENT — PAIN SCALES - GENERAL
PAINLEVEL_OUTOF10: 0
PAINLEVEL_OUTOF10: 0
PAINLEVEL_OUTOF10: 3
PAINLEVEL_OUTOF10: 0

## 2021-08-11 NOTE — PLAN OF CARE
Problem: Falls - Risk of:  Goal: Will remain free from falls  Description: Will remain free from falls  8/11/2021 1046 by Brittney Cee RN  Outcome: Met This Shift

## 2021-08-11 NOTE — PROGRESS NOTES
Subjective: The patient is awake and alert, resting in bed, wife at bedside. No acute events overnight. Denies chest pain, angina, SOB     Objective:    BP (!) 160/92   Pulse 71   Temp 98 °F (36.7 °C) (Oral)   Resp 16   Ht 6' (1.829 m)   Wt 212 lb 4.8 oz (96.3 kg)   SpO2 98%   BMI 28.79 kg/m²     In: 515 [P.O.:515]  Out: 1103   In: 515   Out: 1103 [TBICX:5890]    General appearance: NAD, conversant  HEENT: AT/NC, MMM  Neck: FROM, supple  Lungs: Clear to auscultation  CV: A-paced, no MRGs  Vasc: Radial pulses 2+  Abdomen: Soft, non-tender; no masses or HSM  Extremities: No peripheral edema or digital cyanosis  Skin: no rash, lesions or ulcers  Psych: Alert and oriented to person, place and time  Neuro: Alert and interactive     Recent Labs     08/10/21  0630 08/11/21  0243   WBC 5.3 6.9   HGB 10.7* 11.5*   HCT 30.7* 33.6*    178       Recent Labs     08/10/21  1520 08/10/21  2100 08/11/21  0243   * 124* 127*  127*   K 4.7 4.5 4.2  4.4   CL 94* 91* 93*  93*   CO2 24 25 24  25   BUN 18 19 21  20   CREATININE 1.5* 2.0* 1.7*  1.7*   CALCIUM 8.3* 7.8* 8.9  8.9       Assessment:    Active Problems:    Hyponatremia  Resolved Problems:    * No resolved hospital problems. *      Plan:    Fluid restriction  Strict I/Os  Monitor labs, BMP scheduled every 12 hours  Continue home meds  Monitor blood pressure  Hydralazine PRN  Nephrology following  Neurology following--CT head with chronic findings  Supportive care     Medication for other comorbidities continue as appropriate dose adjustment as necessary.     DVT prophylaxis  PT OT  Discharge planning  Case discussed with attending and agreed upon plan of care    Linda Go, BERNARDINO - CNP, APRN-CNP  1:50 PM  8/11/2021     Felt better yesterday   Wants to be able to dc soon   Monitor bp closely   Na imrpoved   Add bp meds as appropriate for better control - alpha blocker     I personally saw, examined and provided care for the patient. Radiographs, labs and medication list were reviewed by me independently. The case was discussed in detail and plans for care were established. Review of BERNARDINO Ritchie CNP, documentation was conducted and revisions were made as appropriate directly by me. I agree with the above documented exam, problem list, and plan of care.      Candace Wilkinson MD  8:29 PM  8/11/2021

## 2021-08-11 NOTE — PROGRESS NOTES
Call placed to [facility] cardiology, left voicemail with pacemaker nurse regarding patient's pacer. Need to know if it is MRI compatible. Patient stated he knows it is a [employer] but unsure of the model, he had it placed at [facility]. Will await call back at this time.        Dedra Almeida RN

## 2021-08-11 NOTE — PROGRESS NOTES
Occupational Therapy  OT BEDSIDE TREATMENT NOTE      Date:2021  Patient Name: Penelope Chamberlain  MRN: [id number]  : 1934  Room: [address] [place] [address]        Evaluating OT: Cozette Harts L. Marvia Goltz, OTR/CHRIS - [id number]     Referring Provider: Pascale Garcia MD; BERNARDINO Ruiz - CNP  Specific Provider Orders/Date: \"OT eval and treat\" - 2021 and 8/10/2021     Diagnosis: Hyponatremia [E87.1]       Pertinent Medical History: No past medical history on file.     Precautions: fall risk     Assessment of Current Deficits:    [x]? Functional mobility             [x]?ADLs           [x]? Strength                  [x]? Cognition   [x]? Functional transfers           [x]? IADLs         [x]? Safety Awareness   [x]? Endurance   []? Fine Coordination              [x]? Balance      []? Vision/perception   [x]? Sensation     []? Gross Motor Coordination  []? ROM           []? Delirium                   []? Motor Control      OT PLAN OF CARE   OT POC is based on physician orders, patient diagnosis, and results of clinical assessment.   Frequency/Duration 2-5 days/week for 2 weeks PRN   Specific OT Treatment Interventions to Include:   * Instruction/training on adapted ADL techniques and AE recommendations to increase functional independence within precautions       * Training on energy conservation strategies, correct breathing pattern and techniques to improve independence/tolerance for self-care routine  * Functional transfer/mobility training/DME recommendations for increased independence, safety, and fall prevention  * Patient/Family education to increase follow through with safety techniques and functional independence  * Recommendation of environmental modifications for increased safety with functional transfers/mobility and ADLs  * Cognitive retraining/development of therapeutic activities to improve problem solving, judgement, memory, and attention for increased safety/participation in ADL/IADL tasks  * Therapeutic exercise to improve motor endurance, ROM, and functional strength for ADLs/functional transfers  * Therapeutic activities to facilitate/challenge dynamic balance, stand tolerance for increased safety and independence with ADLs  * Neuro-muscular re-education: facilitation of righting/equilibrium reactions, midline orientation, scapular stability/mobility, normalization of muscle tone, and facilitation of volitional active controled movement     Recommended Adaptive Equipment: TBD     Home Living: Patient lives with his wife in a one-floor home (three steps down to access family room area with TV and computer). Bathroom Setup: tub shower (no seat, no grab bars)      Prior Level of Function (PLOF): Patient reported that he was independent with ADLs, some IADLs, and functional mobility prior to this hospitalization.        Pain Level: no pain reported during this session. Cognition: Patient alert and grossly oriented. Functional Assessment:                  AM-PAC Daily Activity Raw Score: 19/24    Initial Eval Status  Date: 8/10/2021 Treatment Status  Date: 8/11/21   Short Term Goals = Long Term Goals   Feeding Independent   N/A   Grooming SBA for hand hygiene and hair combing while standing at sinkside. Supervision   Mod I / Independent  (seated/standing at sink)   UB Dressing Setup   Independent  (including item retrieval)   LB Dressing Min A  Setup  Mod I / Burnard Shauna - with use of AE, as needed/appropriate   Bathing Min A   Mod I / Independent - with use of AE/DME, as needed/appropriate   Toileting CGA Supervision   Mod I / Independent   Bed Mobility  Supine-to-Sit: Independent       Functional Transfers Sit-to-Stand: SBA   from EOB Independent   Independent   Functional Mobility SBA   (without device) for household+ distance within patient's room, bathroom, and hallway.  supervision without device.   Mod I / Independent with functional mobility (with device, as needed/appropriate) in order to maximize independence with ADLs/IADLs and other functional tasks. Balance Sitting: Good  (at EOB)  Standing: Fair+  (without device)   Good dynamic standing balance during completion of ADLs/IADLs and other functional tasks. Activity Tolerance Fair+  fair  Patient will demonstrate Good understanding and consistent implementation of energy conservation techniques and work simplification techniques into ADL/IADL routines. B UE Strength 4-/5 grossly   Patient will demonstrate 4+/5 B UE strength in order to maximize independence with ADLs and functional transfers. Comments:  Pt walking with daughter upon therapist arrival.  Pt going to and from bathroom without assist.  No bathing and dressing needs at this time. Pt recommended to wear shoes for increased support during mobility. Pt and daughter reported no other questions or concerns with pt's ability to complete ADL activity. Pt remained in chair in room at end of the session. Education/treatment:  ADL retraining with instruction of energy conservation techniques for increased tolerance for self care skills. · Pt has made  progress towards set goals.        Time In: 1:30  Time Out: 1:40      Min Units   Therapeutic Ex 57388     Therapeutic Activities 80110     ADL/Self Care 16071 10 1   Orthotic Management 74505     Neuro Re-Ed 00592     Non-Billable Time     TOTAL TIMED TREATMENT 10 [address] [facility] SUDHA/L [id number]

## 2021-08-11 NOTE — PROGRESS NOTES
Neurology Progress Note    Patient:  Lorin Bartlett      Unit/Bed:0416/0416-A    YOB: 1934    MRN: [id number]     Acct: [de-identified]     Admit date: 8/9/2021    Neurology follow-up regarding abnormal CT at referring hospital    Patient Seen, Chart, Physician notes, Labs, Radiology studies reviewed. Subjective: The patient has no new complaints. He notes that there have been reductions in some of his blood pressure medications, though is not sure if that was because he was dizzy or some other reason. Past, Family, Social History unchanged from admission. Diet:  ADULT DIET; Regular; 1000 ml    Medications:  Scheduled Meds:   doxazosin  2 mg Oral Daily    apixaban  5 mg Oral BID    atorvastatin  10 mg Oral Daily    dilTIAZem  120 mg Oral Daily    famotidine  20 mg Oral Daily    flecainide  150 mg Oral BID    tamsulosin  0.4 mg Oral Daily    sodium chloride flush  5-40 mL Intravenous 2 times per day     Continuous Infusions:   sodium chloride       PRN Meds:LORazepam, hydrALAZINE, labetalol, sodium chloride flush, sodium chloride, ondansetron **OR** ondansetron, polyethylene glycol, acetaminophen **OR** acetaminophen    Objective:    Vitals: BP (!) 158/80   Pulse 71   Temp 98 °F (36.7 °C) (Oral)   Resp 16   Ht 6' (1.829 m)   Wt 212 lb 4.8 oz (96.3 kg)   SpO2 98%   BMI 28.79 kg/m²   Physical Exam:  Alert and attentive. Language appropriate, with no aphasia. He is a little vague on which doctor has told him what. Wife often has to correct him. 24 hour intake/output:    Intake/Output Summary (Last 24 hours) at 8/11/2021 1238  Last data filed at 8/11/2021 1153  Gross per 24 hour   Intake 635 ml   Output 825 ml   Net -190 ml     Last 3 weights:   Wt Readings from Last 3 Encounters:   08/09/21 212 lb 4.8 oz (96.3 kg)       CBC:   Recent Labs     08/10/21  0630 08/11/21  0243   WBC 5.3 6.9   HGB 10.7* 11.5*    178     BMP:    Recent Labs     08/10/21  1520 08/10/21  2100 08/11/21  0243   * 124* 127*  127*   K 4.7 4.5 4.2  4.4   CL 94* 91* 93*  93*   CO2 24 25 24  25   BUN 18 19 21  20   CREATININE 1.5* 2.0* 1.7*  1.7*   GLUCOSE 89 104* 109*  110*     Calcium:  Recent Labs     08/11/21  0243   CALCIUM 8.9  8.9       CT brain images reviewed-there are multiple old and age indeterminate infarcts mainly in the basal ganglia region; suboptimal film due to movement                Assessment/Plan:    Active Problems:    Hyponatremia  Resolved Problems:    * No resolved hospital problems. 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 No lipid profile in our labs so that will be checked. Continue to work on better blood pressure control. Patient is anticoagulated with apixaban.       Electronically signed by Keyla Hein DO on 8/11/2021 at 12:38 PM    Neurology

## 2021-08-11 NOTE — PROGRESS NOTES
Spoke with Anayeli Mohamud, she spoke with Pharmacy regarding Hydralazine Q2. Per pharmacy ok to give Q2.

## 2021-08-11 NOTE — PROGRESS NOTES
Spoke with [facility] cardiology, patient's pacer is not MRI compatible. MRI was notified. Dr. Daphney Zuniga notified as well.        Jamila Pitts RN

## 2021-08-11 NOTE — PROGRESS NOTES
RN advised of continued hypertension >200 despite hydralazine and labetalol q4h, Patient now complaining of mild headache. CT head ordered, discussed increasing frequency of hydralazine with pharmacist who stated q2h prn was safe. RN advised to call rrt for worsening of his condition or if the patient decompensates.

## 2021-08-11 NOTE — CARE COORDINATION
PT am-pac 20. Plan remains to return home w/ wife on discharge- [facility]  No needs Caro Whitehead, RN case manager

## 2021-08-12 LAB
ACANTHOCYTES: ABNORMAL
ANION GAP SERPL CALCULATED.3IONS-SCNC: 11 MMOL/L (ref 7–16)
ANION GAP SERPL CALCULATED.3IONS-SCNC: 9 MMOL/L (ref 7–16)
BASOPHILS ABSOLUTE: 0.02 E9/L (ref 0–0.2)
BASOPHILS RELATIVE PERCENT: 0.3 % (ref 0–2)
BUN BLDV-MCNC: 20 MG/DL (ref 6–23)
BUN BLDV-MCNC: 28 MG/DL (ref 6–23)
CALCIUM SERPL-MCNC: 8.2 MG/DL (ref 8.6–10.2)
CALCIUM SERPL-MCNC: 8.8 MG/DL (ref 8.6–10.2)
CHLORIDE BLD-SCNC: 92 MMOL/L (ref 98–107)
CHLORIDE BLD-SCNC: 94 MMOL/L (ref 98–107)
CHOLESTEROL, TOTAL: 160 MG/DL (ref 0–199)
CO2: 24 MMOL/L (ref 22–29)
CO2: 24 MMOL/L (ref 22–29)
CREAT SERPL-MCNC: 1.4 MG/DL (ref 0.7–1.2)
CREAT SERPL-MCNC: 1.8 MG/DL (ref 0.7–1.2)
EOSINOPHILS ABSOLUTE: 0.16 E9/L (ref 0.05–0.5)
EOSINOPHILS RELATIVE PERCENT: 2 % (ref 0–6)
GFR AFRICAN AMERICAN: 43
GFR AFRICAN AMERICAN: 58
GFR NON-AFRICAN AMERICAN: 36 ML/MIN/1.73
GFR NON-AFRICAN AMERICAN: 48 ML/MIN/1.73
GLUCOSE BLD-MCNC: 110 MG/DL (ref 74–99)
GLUCOSE BLD-MCNC: 123 MG/DL (ref 74–99)
HCT VFR BLD CALC: 33.7 % (ref 37–54)
HDLC SERPL-MCNC: 73 MG/DL
HEMOGLOBIN: 11.5 G/DL (ref 12.5–16.5)
IMMATURE GRANULOCYTES #: 0.07 E9/L
IMMATURE GRANULOCYTES %: 0.9 % (ref 0–5)
LDL CHOLESTEROL CALCULATED: 79 MG/DL (ref 0–99)
LYMPHOCYTES ABSOLUTE: 0.44 E9/L (ref 1.5–4)
LYMPHOCYTES RELATIVE PERCENT: 5.6 % (ref 20–42)
MCH RBC QN AUTO: 29.9 PG (ref 26–35)
MCHC RBC AUTO-ENTMCNC: 34.1 % (ref 32–34.5)
MCV RBC AUTO: 87.5 FL (ref 80–99.9)
MONOCYTES ABSOLUTE: 1.1 E9/L (ref 0.1–0.95)
MONOCYTES RELATIVE PERCENT: 14 % (ref 2–12)
NEUTROPHILS ABSOLUTE: 6.09 E9/L (ref 1.8–7.3)
NEUTROPHILS RELATIVE PERCENT: 77.2 % (ref 43–80)
OVALOCYTES: ABNORMAL
PDW BLD-RTO: 12.3 FL (ref 11.5–15)
PLATELET # BLD: 176 E9/L (ref 130–450)
PMV BLD AUTO: 8.3 FL (ref 7–12)
POIKILOCYTES: ABNORMAL
POTASSIUM SERPL-SCNC: 4.6 MMOL/L (ref 3.5–5)
POTASSIUM SERPL-SCNC: 4.6 MMOL/L (ref 3.5–5)
RBC # BLD: 3.85 E12/L (ref 3.8–5.8)
SCHISTOCYTES: ABNORMAL
SODIUM BLD-SCNC: 125 MMOL/L (ref 132–146)
SODIUM BLD-SCNC: 129 MMOL/L (ref 132–146)
TRIGL SERPL-MCNC: 39 MG/DL (ref 0–149)
VLDLC SERPL CALC-MCNC: 8 MG/DL
WBC # BLD: 7.9 E9/L (ref 4.5–11.5)

## 2021-08-12 PROCEDURE — 2580000003 HC RX 258: Performed by: INTERNAL MEDICINE

## 2021-08-12 PROCEDURE — 36415 COLL VENOUS BLD VENIPUNCTURE: CPT

## 2021-08-12 PROCEDURE — 2060000000 HC ICU INTERMEDIATE R&B

## 2021-08-12 PROCEDURE — 80048 BASIC METABOLIC PNL TOTAL CA: CPT

## 2021-08-12 PROCEDURE — 6370000000 HC RX 637 (ALT 250 FOR IP): Performed by: NURSE PRACTITIONER

## 2021-08-12 PROCEDURE — 80061 LIPID PANEL: CPT

## 2021-08-12 PROCEDURE — 6370000000 HC RX 637 (ALT 250 FOR IP): Performed by: INTERNAL MEDICINE

## 2021-08-12 PROCEDURE — 85025 COMPLETE CBC W/AUTO DIFF WBC: CPT

## 2021-08-12 RX ORDER — SODIUM CHLORIDE 1000 MG
1 TABLET, SOLUBLE MISCELLANEOUS 2 TIMES DAILY WITH MEALS
Status: DISCONTINUED | OUTPATIENT
Start: 2021-08-12 | End: 2021-08-14

## 2021-08-12 RX ORDER — DOXAZOSIN MESYLATE 4 MG/1
4 TABLET ORAL DAILY
Status: DISCONTINUED | OUTPATIENT
Start: 2021-08-13 | End: 2021-08-15 | Stop reason: HOSPADM

## 2021-08-12 RX ADMIN — FLECAINIDE ACETATE 150 MG: 100 TABLET ORAL at 20:09

## 2021-08-12 RX ADMIN — APIXABAN 5 MG: 5 TABLET, FILM COATED ORAL at 08:56

## 2021-08-12 RX ADMIN — DOXAZOSIN 2 MG: 2 TABLET ORAL at 08:56

## 2021-08-12 RX ADMIN — FAMOTIDINE 20 MG: 20 TABLET ORAL at 08:55

## 2021-08-12 RX ADMIN — Medication 1 G: at 17:05

## 2021-08-12 RX ADMIN — SODIUM CHLORIDE, PRESERVATIVE FREE 10 ML: 5 INJECTION INTRAVENOUS at 20:09

## 2021-08-12 RX ADMIN — ATORVASTATIN CALCIUM 10 MG: 10 TABLET, FILM COATED ORAL at 08:55

## 2021-08-12 RX ADMIN — FLECAINIDE ACETATE 150 MG: 100 TABLET ORAL at 08:57

## 2021-08-12 RX ADMIN — APIXABAN 5 MG: 5 TABLET, FILM COATED ORAL at 20:08

## 2021-08-12 RX ADMIN — Medication 1 G: at 11:55

## 2021-08-12 RX ADMIN — DILTIAZEM HYDROCHLORIDE 120 MG: 120 CAPSULE, COATED, EXTENDED RELEASE ORAL at 08:55

## 2021-08-12 RX ADMIN — SODIUM CHLORIDE, PRESERVATIVE FREE 10 ML: 5 INJECTION INTRAVENOUS at 08:55

## 2021-08-12 RX ADMIN — TAMSULOSIN HYDROCHLORIDE 0.4 MG: 0.4 CAPSULE ORAL at 08:55

## 2021-08-12 ASSESSMENT — PAIN SCALES - GENERAL
PAINLEVEL_OUTOF10: 0
PAINLEVEL_OUTOF10: 0

## 2021-08-12 NOTE — PROGRESS NOTES
Subjective: The patient is awake and alert, sitting in chair at bedside     No acute events overnight. Denies chest pain, angina, SOB     Objective:    BP (!) 158/80   Pulse 79   Temp 98.3 °F (36.8 °C) (Oral)   Resp 16   Ht 6' (1.829 m)   Wt 212 lb 4.8 oz (96.3 kg)   SpO2 97%   BMI 28.79 kg/m²     In: 66 [P.O.:66]  Out: 1670   In: 66   Out: 1670 [Urine:1670]    General appearance: NAD, conversant  HEENT: AT/NC, MMM  Neck: FROM, supple  Lungs: Clear to auscultation  CV: A-paced, no MRGs  Vasc: Radial pulses 2+  Abdomen: Soft, non-tender; no masses or HSM  Extremities: No peripheral edema or digital cyanosis  Skin: no rash, lesions or ulcers  Psych: Alert and oriented to person, place and time  Neuro: Alert and interactive     Recent Labs     08/10/21  0630 08/11/21  0243 08/12/21  0435   WBC 5.3 6.9 7.9   HGB 10.7* 11.5* 11.5*   HCT 30.7* 33.6* 33.7*    178 176       Recent Labs     08/11/21  0243 08/11/21  2051 08/12/21  0900   *  127* 126* 125*   K 4.2  4.4 4.6 4.6   CL 93*  93* 93* 92*   CO2 24  25 22 24   BUN 21  20 25* 20   CREATININE 1.7*  1.7* 1.7* 1.4*   CALCIUM 8.9  8.9 8.4* 8.8       Assessment:    Active Problems:    Hyponatremia  Resolved Problems:    * No resolved hospital problems.  *      Plan:    Fluid restriction  Strict I/Os  Monitor labs, BMP scheduled every 12 hours  Continue home meds  Monitor blood pressure  Hydralazine PRN  Nephrology following  Neurology following--CT head with chronic findings  Supportive care    Patient should discharge home tomorrow   NA improved.     Medication for other comorbidities continue as appropriate dose adjustment as necessary.     DVT prophylaxis  PT OT  Discharge planning  Case discussed with attending and agreed upon plan of care    BERNARDINO Colón - CNP  6:05 PM  8/12/2021     Na fluctuating but stable at mid 120's   Feels better   Eating well  bp better now with cardura / alpha blockade     I personally saw, examined and provided care for the patient. Radiographs, labs and medication list were reviewed by me independently. The case was discussed in detail and plans for care were established. Review of [address] [place] [address], documentation was conducted and revisions were made as appropriate directly by me. I agree with the above documented exam, problem list, and plan of care.      Kalani Lisa MD  7:57 PM  8/12/2021

## 2021-08-12 NOTE — CARE COORDINATION
Continuing to monitor Na and Bp. Plan remains to return home w/ wife on discharge. Declining HHC.  No needs  Kristen Juarez, RN case manager

## 2021-08-12 NOTE — PROGRESS NOTES
Physical Therapy  Facility/Department: [address] INTERNAL MEDICINE 2  Daily Treatment Note  NAME: Tracy Yeager  : 1934  MRN: [id number]    Date of Service: 2021    Attempted to see pt for PT session, pt declined stating that he has been up walking in [facility] PT [phone]

## 2021-08-12 NOTE — PROGRESS NOTES
08/11/2021    CALCIUM 8.8 08/12/2021    BILITOT 0.5 08/11/2021    ALKPHOS 69 08/11/2021    AST 17 08/11/2021    ALT 11 08/11/2021     Magnesium:  No results found for: MG  Phosphorus:    Lab Results   Component Value Date    PHOS 3.7 11/03/2015     Radiology Review:      CT head without IV contrast 8/11/21   Somewhat degraded study by the patient's motion.  The for, the possibility of   acute intracranial hemorrhage cannot be entirely excluded.       Chronic nonemergent findings as above. BRIEF SUMMARY OF INITIAL CONSULT:    Briefly, Mr. Zoie Wallace is an 80year old male with a PMH of CKD stage IIIa, without proteinuria, baseline creatinine 1.4-1.5 mg/dL, secondary to nephrosclerosis, HTN, BPH, PAF on chronic anticoagulation on apixaban, s/p pacemaker, who originally presented to [facility] on August 9, 2021 with altered mental status, frequent falls, and dizziness which has been ongoing for the past few weeks. He was found to be hyponatremic with a sodium level of 121. A CT of the head was obtained there which revealed a questionable basal ganglia CVA. He was subsequently transferred to [facility] on August 9th due to bed availability. A CT of the head was obtained once he was transferred which did not show a CVA. His sodium level had increased to 128 and he was started on D5W to avoid rapid correction, dropping his sodium to 124. D5W was stopped and his sodium level has been stable at 127 ever since. We are consulted for management of hyponatremia. He denies any nausea, vomiting, or diarrhea. He states he eats three meals a day and has been drinking about 5-6 glasses of water a day. He also admits to nocturia with having to get up to urinate about 5-6 times a night. IMPRESSION/RECOMMENDATIONS:      1. Hypotonic hyponatremia, likely secondary to lack of water excretion and increased free water intake. Sodium levels have decreased overnight 125.  Continue fluid restriction and add sodium chloride tablets 1 g po daily. Urine sodium 51, urine osmolality 473. 2. CKD stage IIIa, without proteinuria, baseline creatinine 1.4-1.5 mg/dL, secondary to nephrosclerosis. Renal function improved to baseline. 3. HTN, on diltiazem. We will increase doxazosin to 4 mg po bid for better BP control.  ------------------------------------------------------   4. PAF, on diltiazem and apixaban  5.  BPH, on tamsulosin    Plan:    · Start sodium chloride tablets 1 gm po bid  · Increase Doxazosin to 4 mg PO daily  · Continue 1 L fluid restriction   · Strict I&Os  · Continue to monitor sodium levels, BMP every 12 hours  · Monitor renal function  · Monitor BP      Case and plan discussed with Dr. Yesika Mtz    Electronically signed by BERNARDINO Haynes CNP on 8/12/2021 at 10:16 AM

## 2021-08-13 LAB
ANION GAP SERPL CALCULATED.3IONS-SCNC: 6 MMOL/L (ref 7–16)
ANION GAP SERPL CALCULATED.3IONS-SCNC: 8 MMOL/L (ref 7–16)
ANION GAP SERPL CALCULATED.3IONS-SCNC: 9 MMOL/L (ref 7–16)
BASOPHILS ABSOLUTE: 0.01 E9/L (ref 0–0.2)
BASOPHILS RELATIVE PERCENT: 0.2 % (ref 0–2)
BUN BLDV-MCNC: 21 MG/DL (ref 6–23)
BUN BLDV-MCNC: 23 MG/DL (ref 6–23)
BUN BLDV-MCNC: 26 MG/DL (ref 6–23)
CALCIUM SERPL-MCNC: 8.3 MG/DL (ref 8.6–10.2)
CALCIUM SERPL-MCNC: 8.5 MG/DL (ref 8.6–10.2)
CALCIUM SERPL-MCNC: 8.6 MG/DL (ref 8.6–10.2)
CHLORIDE BLD-SCNC: 90 MMOL/L (ref 98–107)
CHLORIDE BLD-SCNC: 90 MMOL/L (ref 98–107)
CHLORIDE BLD-SCNC: 92 MMOL/L (ref 98–107)
CO2: 24 MMOL/L (ref 22–29)
CO2: 24 MMOL/L (ref 22–29)
CO2: 25 MMOL/L (ref 22–29)
CREAT SERPL-MCNC: 1.4 MG/DL (ref 0.7–1.2)
CREAT SERPL-MCNC: 1.4 MG/DL (ref 0.7–1.2)
CREAT SERPL-MCNC: 1.6 MG/DL (ref 0.7–1.2)
EOSINOPHILS ABSOLUTE: 0.1 E9/L (ref 0.05–0.5)
EOSINOPHILS RELATIVE PERCENT: 1.5 % (ref 0–6)
GFR AFRICAN AMERICAN: 50
GFR AFRICAN AMERICAN: 58
GFR AFRICAN AMERICAN: 58
GFR NON-AFRICAN AMERICAN: 41 ML/MIN/1.73
GFR NON-AFRICAN AMERICAN: 48 ML/MIN/1.73
GFR NON-AFRICAN AMERICAN: 48 ML/MIN/1.73
GLUCOSE BLD-MCNC: 112 MG/DL (ref 74–99)
GLUCOSE BLD-MCNC: 115 MG/DL (ref 74–99)
GLUCOSE BLD-MCNC: 123 MG/DL (ref 74–99)
HCT VFR BLD CALC: 29.8 % (ref 37–54)
HEMOGLOBIN: 10.3 G/DL (ref 12.5–16.5)
IMMATURE GRANULOCYTES #: 0.05 E9/L
IMMATURE GRANULOCYTES %: 0.8 % (ref 0–5)
LYMPHOCYTES ABSOLUTE: 0.4 E9/L (ref 1.5–4)
LYMPHOCYTES RELATIVE PERCENT: 6 % (ref 20–42)
MCH RBC QN AUTO: 29.9 PG (ref 26–35)
MCHC RBC AUTO-ENTMCNC: 34.6 % (ref 32–34.5)
MCV RBC AUTO: 86.6 FL (ref 80–99.9)
MONOCYTES ABSOLUTE: 0.97 E9/L (ref 0.1–0.95)
MONOCYTES RELATIVE PERCENT: 14.7 % (ref 2–12)
NEUTROPHILS ABSOLUTE: 5.09 E9/L (ref 1.8–7.3)
NEUTROPHILS RELATIVE PERCENT: 76.8 % (ref 43–80)
OVALOCYTES: ABNORMAL
PDW BLD-RTO: 12.2 FL (ref 11.5–15)
PLATELET # BLD: 160 E9/L (ref 130–450)
PMV BLD AUTO: 8.2 FL (ref 7–12)
POIKILOCYTES: ABNORMAL
POTASSIUM SERPL-SCNC: 4.2 MMOL/L (ref 3.5–5)
POTASSIUM SERPL-SCNC: 4.4 MMOL/L (ref 3.5–5)
POTASSIUM SERPL-SCNC: 4.7 MMOL/L (ref 3.5–5)
RBC # BLD: 3.44 E12/L (ref 3.8–5.8)
SCHISTOCYTES: ABNORMAL
SODIUM BLD-SCNC: 122 MMOL/L (ref 132–146)
SODIUM BLD-SCNC: 123 MMOL/L (ref 132–146)
SODIUM BLD-SCNC: 123 MMOL/L (ref 132–146)
WBC # BLD: 6.6 E9/L (ref 4.5–11.5)

## 2021-08-13 PROCEDURE — 6370000000 HC RX 637 (ALT 250 FOR IP): Performed by: NURSE PRACTITIONER

## 2021-08-13 PROCEDURE — 6370000000 HC RX 637 (ALT 250 FOR IP): Performed by: FAMILY MEDICINE

## 2021-08-13 PROCEDURE — 85025 COMPLETE CBC W/AUTO DIFF WBC: CPT

## 2021-08-13 PROCEDURE — 36415 COLL VENOUS BLD VENIPUNCTURE: CPT

## 2021-08-13 PROCEDURE — 2060000000 HC ICU INTERMEDIATE R&B

## 2021-08-13 PROCEDURE — 2580000003 HC RX 258: Performed by: INTERNAL MEDICINE

## 2021-08-13 PROCEDURE — 6370000000 HC RX 637 (ALT 250 FOR IP): Performed by: INTERNAL MEDICINE

## 2021-08-13 PROCEDURE — 80048 BASIC METABOLIC PNL TOTAL CA: CPT

## 2021-08-13 RX ORDER — LORAZEPAM 0.5 MG/1
0.5 TABLET ORAL ONCE
Status: COMPLETED | OUTPATIENT
Start: 2021-08-13 | End: 2021-08-13

## 2021-08-13 RX ADMIN — ATORVASTATIN CALCIUM 10 MG: 10 TABLET, FILM COATED ORAL at 09:06

## 2021-08-13 RX ADMIN — DOXAZOSIN 4 MG: 4 TABLET ORAL at 09:06

## 2021-08-13 RX ADMIN — SODIUM CHLORIDE, PRESERVATIVE FREE 10 ML: 5 INJECTION INTRAVENOUS at 09:07

## 2021-08-13 RX ADMIN — ACETAMINOPHEN 650 MG: 325 TABLET ORAL at 05:36

## 2021-08-13 RX ADMIN — APIXABAN 5 MG: 5 TABLET, FILM COATED ORAL at 09:06

## 2021-08-13 RX ADMIN — Medication 1 G: at 09:06

## 2021-08-13 RX ADMIN — APIXABAN 5 MG: 5 TABLET, FILM COATED ORAL at 20:58

## 2021-08-13 RX ADMIN — FAMOTIDINE 20 MG: 20 TABLET ORAL at 09:06

## 2021-08-13 RX ADMIN — SODIUM CHLORIDE, PRESERVATIVE FREE 10 ML: 5 INJECTION INTRAVENOUS at 20:58

## 2021-08-13 RX ADMIN — FLECAINIDE ACETATE 150 MG: 100 TABLET ORAL at 09:07

## 2021-08-13 RX ADMIN — POLYETHYLENE GLYCOL 3350 17 G: 17 POWDER, FOR SOLUTION ORAL at 09:06

## 2021-08-13 RX ADMIN — TAMSULOSIN HYDROCHLORIDE 0.4 MG: 0.4 CAPSULE ORAL at 09:06

## 2021-08-13 RX ADMIN — DILTIAZEM HYDROCHLORIDE 120 MG: 120 CAPSULE, COATED, EXTENDED RELEASE ORAL at 09:06

## 2021-08-13 RX ADMIN — LORAZEPAM 0.5 MG: 0.5 TABLET ORAL at 11:59

## 2021-08-13 RX ADMIN — ACETAMINOPHEN 650 MG: 325 TABLET ORAL at 20:57

## 2021-08-13 RX ADMIN — FLECAINIDE ACETATE 150 MG: 100 TABLET ORAL at 20:57

## 2021-08-13 RX ADMIN — Medication 1 G: at 16:52

## 2021-08-13 ASSESSMENT — PAIN SCALES - GENERAL
PAINLEVEL_OUTOF10: 0
PAINLEVEL_OUTOF10: 3

## 2021-08-13 ASSESSMENT — PAIN DESCRIPTION - PAIN TYPE: TYPE: ACUTE PAIN

## 2021-08-13 ASSESSMENT — PAIN DESCRIPTION - LOCATION: LOCATION: BACK

## 2021-08-13 NOTE — CARE COORDINATION
Renal continues to monitor sodium levels-decreased to 122 this am-pt is non-compliant with fluid restriction. Plan remains to return home w/ wife on discharge- declining [facility]. PT am-pac 20.  Will follow  Kamla Barrett RNcase manager

## 2021-08-13 NOTE — PROGRESS NOTES
Subjective: The patient is awake and alert, sitting in chair at bedside     No acute events overnight. Denies chest pain, angina, SOB     Objective:    BP (!) 158/82   Pulse 87   Temp 98.7 °F (37.1 °C) (Oral)   Resp 18   Ht 6' (1.829 m)   Wt 212 lb 4.8 oz (96.3 kg)   SpO2 98%   BMI 28.79 kg/m²     In: 10 [I.V.:10]  Out: 450   In: 10   Out: 450 [Urine:450]    General appearance: NAD, conversant  HEENT: AT/NC, MMM  Neck: FROM, supple  Lungs: Clear to auscultation  CV: A-paced, no MRGs  Vasc: Radial pulses 2+  Abdomen: Soft, non-tender; no masses or HSM  Extremities: No peripheral edema or digital cyanosis  Skin: no rash, lesions or ulcers  Psych: Alert and oriented to person, place and time  Neuro: Alert and interactive     Recent Labs     08/11/21  0243 08/12/21  0435 08/13/21  0300   WBC 6.9 7.9 6.6   HGB 11.5* 11.5* 10.3*   HCT 33.6* 33.7* 29.8*    176 160       Recent Labs     08/12/21  2030 08/13/21  0300 08/13/21  0805   * 123* 122*   K 4.6 4.2 4.4   CL 94* 92* 90*   CO2 24 25 24   BUN 28* 26* 21   CREATININE 1.8* 1.6* 1.4*   CALCIUM 8.2* 8.6 8.5*       Assessment:    Active Problems:    Hyponatremia  Resolved Problems:    * No resolved hospital problems. *      Plan:    Fluid restriction  Strict I/Os  Monitor labs, BMP scheduled every 12 hours  Continue home meds  Monitor blood pressure  Hydralazine PRN  Nephrology following  Neurology following--CT head with chronic findings  Supportive care  Patient's Na+ today 122  Patient has been drinking water out of the spicket in the bathroom. Educated patient on the importance of being compliant with fluid restrictions. Patient requesting something for his nerves  Ativan 0.5 mg once ordered.     BMP q 6 hours     Medication for other comorbidities continue as appropriate dose adjustment as necessary.     DVT prophylaxis  PT OT  Discharge planning  Case discussed with attending and agreed upon plan of care    BERNARDINO Mckeon - CNP  12:22 PM  8/13/2021     Feels somewhat tired today  Fluctuating sodium levels from 1 29 last night to 1 23 today  Multiple questions answered to family, they would like to speak to nephrology as well regarding questions about his fluid restriction, sodium, salt intake etc.  Will defer to renal  Continue to monitor labs    I personally saw, examined and provided care for the patient. Radiographs, labs and medication list were reviewed by me independently. The case was discussed in detail and plans for care were established. Review of [address] [place] [address], documentation was conducted and revisions were made as appropriate directly by me. I agree with the above documented exam, problem list, and plan of care.      Momo Thomas MD  8:33 PM  8/13/2021

## 2021-08-13 NOTE — PROGRESS NOTES
Department of Internal Medicine  Nephrology Consult Note      SUBJECTIVE:  We are following Mr. Zoie Wallace for hyponatremia and hypertension. Patient is voicing frustration over fluid restriction.     PHYSICAL EXAM:      Vitals:    VITALS:  BP (!) 170/86   Pulse 84   Temp 97.2 °F (36.2 °C) (Oral)   Resp 18   Ht 6' (1.829 m)   Wt 212 lb 4.8 oz (96.3 kg)   SpO2 97%   BMI 28.79 kg/m²   24HR INTAKE/OUTPUT:      Intake/Output Summary (Last 24 hours) at 8/13/2021 0654  Last data filed at 8/13/2021 0528  Gross per 24 hour   Intake 60 ml   Output 1320 ml   Net -1260 ml       Constitutional:  Alert and oriented, NAD  HEENT:  Normocephalic, PERRL  Respiratory:  CTA bilaterally  Cardiovascular/Edema:  RRR, S1,S2   Gastrointestinal:  Soft, rounded, nontender, nondistended  Neurologic:  Nonfocal, SILVA  Skin:  Warm, dry, ecchymosis BUE  Other:  No edema   Scheduled Meds:   doxazosin  4 mg Oral Daily    sodium chloride  1 g Oral BID WC    apixaban  5 mg Oral BID    atorvastatin  10 mg Oral Daily    dilTIAZem  120 mg Oral Daily    famotidine  20 mg Oral Daily    flecainide  150 mg Oral BID    tamsulosin  0.4 mg Oral Daily    sodium chloride flush  5-40 mL Intravenous 2 times per day     Continuous Infusions:   sodium chloride       PRN Meds:.hydrALAZINE, labetalol, sodium chloride flush, sodium chloride, ondansetron **OR** ondansetron, polyethylene glycol, acetaminophen **OR** acetaminophen    DATA:    CBC:   Lab Results   Component Value Date    WBC 6.6 08/13/2021    RBC 3.44 08/13/2021    HGB 10.3 08/13/2021    HCT 29.8 08/13/2021    MCV 86.6 08/13/2021    MCH 29.9 08/13/2021    MCHC 34.6 08/13/2021    RDW 12.2 08/13/2021     08/13/2021    MPV 8.2 08/13/2021     CMP:    Lab Results   Component Value Date     08/13/2021    K 4.2 08/13/2021    K 4.3 08/10/2021    CL 92 08/13/2021    CO2 25 08/13/2021    BUN 26 08/13/2021    CREATININE 1.6 08/13/2021    GFRAA 50 08/13/2021    LABGLOM 41 08/13/2021 GLUCOSE 112 08/13/2021    PROT 6.2 08/11/2021    LABALBU 3.9 08/11/2021    CALCIUM 8.6 08/13/2021    BILITOT 0.5 08/11/2021    ALKPHOS 69 08/11/2021    AST 17 08/11/2021    ALT 11 08/11/2021     Magnesium:  No results found for: MG  Phosphorus:    Lab Results   Component Value Date    PHOS 3.7 11/03/2015     Radiology Review:      CT head without IV contrast 8/11/21   Somewhat degraded study by the patient's motion.  The for, the possibility of   acute intracranial hemorrhage cannot be entirely excluded.       Chronic nonemergent findings as above. BRIEF SUMMARY OF INITIAL CONSULT:    Briefly, Mr. Jennifer Villeda is an 80year old male with a PMH of CKD stage IIIa, without proteinuria, baseline creatinine 1.4-1.5 mg/dL, secondary to nephrosclerosis, HTN, BPH, PAF on chronic anticoagulation on apixaban, s/p pacemaker, who originally presented to [facility] on August 9, 2021 with altered mental status, frequent falls, and dizziness which has been ongoing for the past few weeks. He was found to be hyponatremic with a sodium level of 121. A CT of the head was obtained there which revealed a questionable basal ganglia CVA. He was subsequently transferred to [facility] on August 9th due to bed availability. A CT of the head was obtained once he was transferred which did not show a CVA. His sodium level had increased to 128 and he was started on D5W to avoid rapid correction, dropping his sodium to 124. D5W was stopped and his sodium level has been stable at 127 ever since. We are consulted for management of hyponatremia. He denies any nausea, vomiting, or diarrhea. He states he eats three meals a day and has been drinking about 5-6 glasses of water a day. He also admits to nocturia with having to get up to urinate about 5-6 times a night. IMPRESSION/RECOMMENDATIONS:      1. Hypotonic hyponatremia, likely secondary to lack of water excretion and increased free water intake.  Sodium levels have decreased again overnight 122, patient states he got up and drank several glasses of water in the night. Dry tray and continue sodium chloride tablets 1 g po daily. Urine sodium 51, urine osmolality 473. 2. CKD stage IIIa, without proteinuria, baseline creatinine 1.4-1.5 mg/dL, secondary to nephrosclerosis. Renal function improved to baseline. 3. HTN, on diltiazem. We will increase doxazosin to 4 mg po bid for better BP control.  ------------------------------------------------------   4. PAF, on diltiazem and apixaban  5.  BPH, on tamsulosin    Plan:    · Continue sodium chloride tablets 1 gm po bid  · Continue Doxazosin to 4 mg PO daily  · Dry tray  · Strict I&Os  · BMP Q 6hrs call for sodium <122 or >126  · Monitor renal function  · Monitor BP      Case and plan discussed with Dr. Chase Vora    Electronically signed by BERNARDINO Whitaker CNP on 8/13/2021 at 6:54 AM

## 2021-08-13 NOTE — PLAN OF CARE
Problem: Falls - Risk of:  Goal: Will remain free from falls  Description: Will remain free from falls  8/12/2021 2209 by Foster Adams RN  Outcome: Met This Shift  8/12/2021 1304 by Jenna Christian RN  Outcome: Met This Shift  Goal: Absence of physical injury  Description: Absence of physical injury  8/12/2021 2209 by Foster Adams RN  Outcome: Met This Shift  8/12/2021 1304 by Jenna Christian RN  Outcome: Met This Shift

## 2021-08-13 NOTE — FLOWSHEET NOTE
Inpatient Wound Care    Admit Date: 8/9/2021 11:07 PM    Reason for consult:  R arm    Significant history:  Admitted with hyponatremia. History includes: HTN, A-fib, pacemaker, CVA. Wound history:  POA    Findings:       08/13/21 1356   Wound 08/13/21 Arm Right   Date First Assessed/Time First Assessed: 08/13/21 1356   Present on Hospital Admission: Yes  Location: Arm  Wound Location Orientation: Right   Wound Etiology Skin Tear   Dressing Status New dressing applied   Wound Cleansed Cleansed with saline   Dressing/Treatment   (adaptic, opticell, ABD, kerlix)   Dressing Change Due 08/16/21   Wound Length (cm) 1 cm   Wound Width (cm) 2 cm   Wound Depth (cm) 0.2 cm   Wound Surface Area (cm^2) 2 cm^2   Wound Volume (cm^3) 0.4 cm^3   Wound Assessment Pink/red   Drainage Amount Moderate   Drainage Description Serosanguinous   Odor None   Bryanna-wound Assessment   (ecchymotic)       Impression:  Skin tear R arm    Interventions in place:  Wound cleansed with NSS. Applied Adaptic then Opticell. Covered with gauze then kerlix.        Janie Whitehead RN 8/13/2021 1:58 PM

## 2021-08-13 NOTE — PROGRESS NOTES
Family had a lot of questions for nephrology and were not able to see anyone round while they were here. They requested for nephrology to call them. Dr. Don Guan will round tomorrow and talk to the family.

## 2021-08-14 ENCOUNTER — APPOINTMENT (OUTPATIENT)
Dept: GENERAL RADIOLOGY | Age: 86
DRG: 644 | End: 2021-08-14
Attending: INTERNAL MEDICINE
Payer: MEDICARE

## 2021-08-14 LAB
ANION GAP SERPL CALCULATED.3IONS-SCNC: 6 MMOL/L (ref 7–16)
ANION GAP SERPL CALCULATED.3IONS-SCNC: 8 MMOL/L (ref 7–16)
ANION GAP SERPL CALCULATED.3IONS-SCNC: 9 MMOL/L (ref 7–16)
ANISOCYTOSIS: ABNORMAL
BASOPHILS ABSOLUTE: 0.01 E9/L (ref 0–0.2)
BASOPHILS RELATIVE PERCENT: 0.2 % (ref 0–2)
BUN BLDV-MCNC: 22 MG/DL (ref 6–23)
BUN BLDV-MCNC: 23 MG/DL (ref 6–23)
BUN BLDV-MCNC: 24 MG/DL (ref 6–23)
BURR CELLS: ABNORMAL
CALCIUM SERPL-MCNC: 8.7 MG/DL (ref 8.6–10.2)
CALCIUM SERPL-MCNC: 8.9 MG/DL (ref 8.6–10.2)
CALCIUM SERPL-MCNC: 9 MG/DL (ref 8.6–10.2)
CHLORIDE BLD-SCNC: 90 MMOL/L (ref 98–107)
CHLORIDE BLD-SCNC: 91 MMOL/L (ref 98–107)
CHLORIDE BLD-SCNC: 93 MMOL/L (ref 98–107)
CHLORIDE URINE RANDOM: 71 MMOL/L
CO2: 25 MMOL/L (ref 22–29)
CO2: 26 MMOL/L (ref 22–29)
CO2: 26 MMOL/L (ref 22–29)
CREAT SERPL-MCNC: 1.4 MG/DL (ref 0.7–1.2)
CREAT SERPL-MCNC: 1.5 MG/DL (ref 0.7–1.2)
CREAT SERPL-MCNC: 1.6 MG/DL (ref 0.7–1.2)
DOHLE BODIES: ABNORMAL
EOSINOPHILS ABSOLUTE: 0.08 E9/L (ref 0.05–0.5)
EOSINOPHILS RELATIVE PERCENT: 1.3 % (ref 0–6)
GFR AFRICAN AMERICAN: 50
GFR AFRICAN AMERICAN: 54
GFR AFRICAN AMERICAN: 58
GFR NON-AFRICAN AMERICAN: 41 ML/MIN/1.73
GFR NON-AFRICAN AMERICAN: 44 ML/MIN/1.73
GFR NON-AFRICAN AMERICAN: 48 ML/MIN/1.73
GLUCOSE BLD-MCNC: 105 MG/DL (ref 74–99)
GLUCOSE BLD-MCNC: 108 MG/DL (ref 74–99)
GLUCOSE BLD-MCNC: 117 MG/DL (ref 74–99)
HCT VFR BLD CALC: 28.3 % (ref 37–54)
HEMOGLOBIN: 9.6 G/DL (ref 12.5–16.5)
IMMATURE GRANULOCYTES #: 0.05 E9/L
IMMATURE GRANULOCYTES %: 0.8 % (ref 0–5)
LYMPHOCYTES ABSOLUTE: 0.45 E9/L (ref 1.5–4)
LYMPHOCYTES RELATIVE PERCENT: 7 % (ref 20–42)
MCH RBC QN AUTO: 29.6 PG (ref 26–35)
MCHC RBC AUTO-ENTMCNC: 33.9 % (ref 32–34.5)
MCV RBC AUTO: 87.3 FL (ref 80–99.9)
MONOCYTES ABSOLUTE: 0.8 E9/L (ref 0.1–0.95)
MONOCYTES RELATIVE PERCENT: 12.5 % (ref 2–12)
NEUTROPHILS ABSOLUTE: 5.01 E9/L (ref 1.8–7.3)
NEUTROPHILS RELATIVE PERCENT: 78.2 % (ref 43–80)
OSMOLALITY URINE: 571 MOSM/KG (ref 300–900)
OSMOLALITY: 270 MOSM/KG (ref 285–310)
OVALOCYTES: ABNORMAL
PDW BLD-RTO: 12 FL (ref 11.5–15)
PLATELET # BLD: 163 E9/L (ref 130–450)
PMV BLD AUTO: 8.9 FL (ref 7–12)
POIKILOCYTES: ABNORMAL
POLYCHROMASIA: ABNORMAL
POTASSIUM SERPL-SCNC: 4.7 MMOL/L (ref 3.5–5)
POTASSIUM SERPL-SCNC: 4.8 MMOL/L (ref 3.5–5)
POTASSIUM SERPL-SCNC: 5 MMOL/L (ref 3.5–5)
POTASSIUM, UR: 61 MMOL/L
RBC # BLD: 3.24 E12/L (ref 3.8–5.8)
SARS-COV-2, NAAT: NOT DETECTED
SCHISTOCYTES: ABNORMAL
SODIUM BLD-SCNC: 123 MMOL/L (ref 132–146)
SODIUM BLD-SCNC: 124 MMOL/L (ref 132–146)
SODIUM BLD-SCNC: 127 MMOL/L (ref 132–146)
SODIUM URINE: 67 MMOL/L
TOXIC GRANULATION: ABNORMAL
WBC # BLD: 6.4 E9/L (ref 4.5–11.5)

## 2021-08-14 PROCEDURE — 80048 BASIC METABOLIC PNL TOTAL CA: CPT

## 2021-08-14 PROCEDURE — 6370000000 HC RX 637 (ALT 250 FOR IP): Performed by: NURSE PRACTITIONER

## 2021-08-14 PROCEDURE — 83930 ASSAY OF BLOOD OSMOLALITY: CPT

## 2021-08-14 PROCEDURE — 82436 ASSAY OF URINE CHLORIDE: CPT

## 2021-08-14 PROCEDURE — 85025 COMPLETE CBC W/AUTO DIFF WBC: CPT

## 2021-08-14 PROCEDURE — 36415 COLL VENOUS BLD VENIPUNCTURE: CPT

## 2021-08-14 PROCEDURE — 2060000000 HC ICU INTERMEDIATE R&B

## 2021-08-14 PROCEDURE — 84300 ASSAY OF URINE SODIUM: CPT

## 2021-08-14 PROCEDURE — 6370000000 HC RX 637 (ALT 250 FOR IP): Performed by: INTERNAL MEDICINE

## 2021-08-14 PROCEDURE — 84133 ASSAY OF URINE POTASSIUM: CPT

## 2021-08-14 PROCEDURE — 71045 X-RAY EXAM CHEST 1 VIEW: CPT

## 2021-08-14 PROCEDURE — 2580000003 HC RX 258: Performed by: INTERNAL MEDICINE

## 2021-08-14 PROCEDURE — 83935 ASSAY OF URINE OSMOLALITY: CPT

## 2021-08-14 PROCEDURE — 87635 SARS-COV-2 COVID-19 AMP PRB: CPT

## 2021-08-14 RX ORDER — SODIUM CHLORIDE 1000 MG
1 TABLET, SOLUBLE MISCELLANEOUS
Status: DISCONTINUED | OUTPATIENT
Start: 2021-08-14 | End: 2021-08-15 | Stop reason: HOSPADM

## 2021-08-14 RX ADMIN — Medication 1 G: at 16:41

## 2021-08-14 RX ADMIN — Medication 1 G: at 08:20

## 2021-08-14 RX ADMIN — DOXAZOSIN 4 MG: 4 TABLET ORAL at 08:20

## 2021-08-14 RX ADMIN — ACETAMINOPHEN 650 MG: 325 TABLET ORAL at 20:07

## 2021-08-14 RX ADMIN — ATORVASTATIN CALCIUM 10 MG: 10 TABLET, FILM COATED ORAL at 08:20

## 2021-08-14 RX ADMIN — TAMSULOSIN HYDROCHLORIDE 0.4 MG: 0.4 CAPSULE ORAL at 08:21

## 2021-08-14 RX ADMIN — FAMOTIDINE 20 MG: 20 TABLET ORAL at 08:20

## 2021-08-14 RX ADMIN — APIXABAN 5 MG: 5 TABLET, FILM COATED ORAL at 08:21

## 2021-08-14 RX ADMIN — APIXABAN 5 MG: 5 TABLET, FILM COATED ORAL at 20:06

## 2021-08-14 RX ADMIN — SODIUM CHLORIDE, PRESERVATIVE FREE 10 ML: 5 INJECTION INTRAVENOUS at 20:08

## 2021-08-14 RX ADMIN — FLECAINIDE ACETATE 150 MG: 100 TABLET ORAL at 20:07

## 2021-08-14 RX ADMIN — SODIUM CHLORIDE, PRESERVATIVE FREE 10 ML: 5 INJECTION INTRAVENOUS at 08:21

## 2021-08-14 RX ADMIN — DILTIAZEM HYDROCHLORIDE 120 MG: 120 CAPSULE, COATED, EXTENDED RELEASE ORAL at 08:21

## 2021-08-14 RX ADMIN — FLECAINIDE ACETATE 150 MG: 100 TABLET ORAL at 08:20

## 2021-08-14 RX ADMIN — Medication 1 G: at 12:31

## 2021-08-14 RX ADMIN — POLYETHYLENE GLYCOL 3350 17 G: 17 POWDER, FOR SOLUTION ORAL at 08:20

## 2021-08-14 ASSESSMENT — PAIN SCALES - GENERAL
PAINLEVEL_OUTOF10: 3
PAINLEVEL_OUTOF10: 0

## 2021-08-14 ASSESSMENT — PAIN DESCRIPTION - PAIN TYPE: TYPE: ACUTE PAIN

## 2021-08-14 ASSESSMENT — PAIN DESCRIPTION - LOCATION: LOCATION: BACK

## 2021-08-14 NOTE — PROGRESS NOTES
Department of Internal Medicine  Nephrology Consult Note      SUBJECTIVE:  We are following Mr. Tracy Yeager for hyponatremia and hypertension. Patient is voicing frustration over fluid restriction.     PHYSICAL EXAM:      Vitals:    VITALS:  BP (!) 176/82   Pulse 84   Temp 98.8 °F (37.1 °C) (Oral)   Resp 18   Ht 6' (1.829 m)   Wt 212 lb 4.8 oz (96.3 kg)   SpO2 96%   BMI 28.79 kg/m²   24HR INTAKE/OUTPUT:      Intake/Output Summary (Last 24 hours) at 8/14/2021 1143  Last data filed at 8/14/2021 0824  Gross per 24 hour   Intake 570 ml   Output 1200 ml   Net -630 ml       Constitutional:  Alert and oriented, NAD  HEENT:  Normocephalic, PERRL  Respiratory:  CTA bilaterally  Cardiovascular/Edema:  RRR, S1,S2   Gastrointestinal:  Soft, rounded, nontender, nondistended  Neurologic:  Nonfocal, SILVA  Skin:  Warm, dry, ecchymosis BUE  Other:  No edema   Scheduled Meds:   doxazosin  4 mg Oral Daily    sodium chloride  1 g Oral BID WC    apixaban  5 mg Oral BID    atorvastatin  10 mg Oral Daily    dilTIAZem  120 mg Oral Daily    famotidine  20 mg Oral Daily    flecainide  150 mg Oral BID    tamsulosin  0.4 mg Oral Daily    sodium chloride flush  5-40 mL Intravenous 2 times per day     Continuous Infusions:   sodium chloride       PRN Meds:.hydrALAZINE, labetalol, sodium chloride flush, sodium chloride, ondansetron **OR** ondansetron, polyethylene glycol, acetaminophen **OR** acetaminophen    DATA:    CBC:   Lab Results   Component Value Date    WBC 6.4 08/14/2021    RBC 3.24 08/14/2021    HGB 9.6 08/14/2021    HCT 28.3 08/14/2021    MCV 87.3 08/14/2021    MCH 29.6 08/14/2021    MCHC 33.9 08/14/2021    RDW 12.0 08/14/2021     08/14/2021    MPV 8.9 08/14/2021     CMP:    Lab Results   Component Value Date     08/14/2021    K 4.7 08/14/2021    K 4.3 08/10/2021    CL 91 08/14/2021    CO2 26 08/14/2021    BUN 23 08/14/2021    CREATININE 1.5 08/14/2021    GFRAA 54 08/14/2021    LABGLOM 44 08/14/2021 GLUCOSE 117 08/14/2021    PROT 6.2 08/11/2021    LABALBU 3.9 08/11/2021    CALCIUM 8.9 08/14/2021    BILITOT 0.5 08/11/2021    ALKPHOS 69 08/11/2021    AST 17 08/11/2021    ALT 11 08/11/2021     Magnesium:  No results found for: MG  Phosphorus:    Lab Results   Component Value Date    PHOS 3.7 11/03/2015     Radiology Review:      CT head without IV contrast 8/11/21   Somewhat degraded study by the patient's motion.  The for, the possibility of   acute intracranial hemorrhage cannot be entirely excluded.       Chronic nonemergent findings as above. BRIEF SUMMARY OF INITIAL CONSULT:    Briefly, Mr. Harpreet Gomez is an 80year old male with a PMH of CKD stage IIIa, without proteinuria, baseline creatinine 1.4-1.5 mg/dL, secondary to nephrosclerosis, HTN, BPH, PAF on chronic anticoagulation on apixaban, s/p pacemaker, who originally presented to [facility] on August 9, 2021 with altered mental status, frequent falls, and dizziness which has been ongoing for the past few weeks. He was found to be hyponatremic with a sodium level of 121. A CT of the head was obtained there which revealed a questionable basal ganglia CVA. He was subsequently transferred to [address] [place] [address] on August 9th due to bed availability. A CT of the head was obtained once he was transferred which did not show a CVA. His sodium level had increased to 128 and he was started on D5W to avoid rapid correction, dropping his sodium to 124. D5W was stopped and his sodium level has been stable at 127 ever since. We are consulted for management of hyponatremia. He denies any nausea, vomiting, or diarrhea. He states he eats three meals a day and has been drinking about 5-6 glasses of water a day. He also admits to nocturia with having to get up to urinate about 5-6 times a night. IMPRESSION/RECOMMENDATIONS:      1. Hypotonic hyponatremia, likely secondary to lack of water excretion and increased free water intake. Patient has high urine osmolality. This could be SIADH, transient, Acute or acute on chronic. Ct brain reviewed. Old CVA. 2. CKD stage IIIa, without proteinuria, baseline creatinine 1.4-1.5 mg/dL, secondary to nephrosclerosis. Renal function improved to baseline. 3. HTN, on diltiazem. We will increase doxazosin to 4 mg po bid for better BP control.  ------------------------------------------------------   4. PAF, on diltiazem and apixaban  5. BPH, on tamsulosin    Plan:    · Increase sodium chloride tablets 1 gm po Tid  · Continue Doxazosin to 4 mg PO daily  · Dry tray  · Strict I&Os  · BMP Q 6hrs call for sodium <122 or >126  · Monitor renal function  · Monitor BP   · Get a chest x-ray, portable.       Case and plan discussed with Dr. Neil Gonzalez    Electronically signed by Brynn Bernheim, MD on 8/14/2021 at 11:43 AM

## 2021-08-14 NOTE — PLAN OF CARE
Problem: Falls - Risk of:  Goal: Will remain free from falls  Description: Will remain free from falls  Outcome: Ongoing  Goal: Absence of physical injury  Description: Absence of physical injury  Outcome: Ongoing     Problem: Pain:  Goal: Pain level will decrease  Description: Pain level will decrease  Outcome: Ongoing  Goal: Control of acute pain  Description: Control of acute pain  Outcome: Ongoing  Goal: Control of chronic pain  Description: Control of chronic pain  Outcome: Ongoing     Problem: Pain:  Goal: Control of acute pain  Description: Control of acute pain  Outcome: Ongoing     Problem: Pain:  Goal: Control of chronic pain  Description: Control of chronic pain  Outcome: Ongoing

## 2021-08-15 VITALS
RESPIRATION RATE: 18 BRPM | DIASTOLIC BLOOD PRESSURE: 88 MMHG | WEIGHT: 212 LBS | SYSTOLIC BLOOD PRESSURE: 166 MMHG | HEIGHT: 72 IN | BODY MASS INDEX: 28.71 KG/M2 | TEMPERATURE: 98.1 F | HEART RATE: 74 BPM | OXYGEN SATURATION: 97 %

## 2021-08-15 LAB
ANION GAP SERPL CALCULATED.3IONS-SCNC: 10 MMOL/L (ref 7–16)
ANION GAP SERPL CALCULATED.3IONS-SCNC: 8 MMOL/L (ref 7–16)
ANION GAP SERPL CALCULATED.3IONS-SCNC: 8 MMOL/L (ref 7–16)
BASOPHILS ABSOLUTE: 0.01 E9/L (ref 0–0.2)
BASOPHILS RELATIVE PERCENT: 0.2 % (ref 0–2)
BUN BLDV-MCNC: 23 MG/DL (ref 6–23)
BUN BLDV-MCNC: 24 MG/DL (ref 6–23)
BUN BLDV-MCNC: 24 MG/DL (ref 6–23)
CALCIUM SERPL-MCNC: 8.2 MG/DL (ref 8.6–10.2)
CALCIUM SERPL-MCNC: 8.6 MG/DL (ref 8.6–10.2)
CALCIUM SERPL-MCNC: 8.8 MG/DL (ref 8.6–10.2)
CHLORIDE BLD-SCNC: 96 MMOL/L (ref 98–107)
CHLORIDE BLD-SCNC: 96 MMOL/L (ref 98–107)
CHLORIDE BLD-SCNC: 97 MMOL/L (ref 98–107)
CO2: 23 MMOL/L (ref 22–29)
CO2: 24 MMOL/L (ref 22–29)
CO2: 24 MMOL/L (ref 22–29)
CREAT SERPL-MCNC: 1.4 MG/DL (ref 0.7–1.2)
CREAT SERPL-MCNC: 1.4 MG/DL (ref 0.7–1.2)
CREAT SERPL-MCNC: 1.5 MG/DL (ref 0.7–1.2)
EOSINOPHILS ABSOLUTE: 0.08 E9/L (ref 0.05–0.5)
EOSINOPHILS RELATIVE PERCENT: 1.4 % (ref 0–6)
GFR AFRICAN AMERICAN: 54
GFR AFRICAN AMERICAN: 58
GFR AFRICAN AMERICAN: 58
GFR NON-AFRICAN AMERICAN: 44 ML/MIN/1.73
GFR NON-AFRICAN AMERICAN: 48 ML/MIN/1.73
GFR NON-AFRICAN AMERICAN: 48 ML/MIN/1.73
GLUCOSE BLD-MCNC: 106 MG/DL (ref 74–99)
GLUCOSE BLD-MCNC: 110 MG/DL (ref 74–99)
GLUCOSE BLD-MCNC: 122 MG/DL (ref 74–99)
HCT VFR BLD CALC: 30.6 % (ref 37–54)
HEMOGLOBIN: 10.2 G/DL (ref 12.5–16.5)
IMMATURE GRANULOCYTES #: 0.05 E9/L
IMMATURE GRANULOCYTES %: 0.9 % (ref 0–5)
LYMPHOCYTES ABSOLUTE: 0.49 E9/L (ref 1.5–4)
LYMPHOCYTES RELATIVE PERCENT: 8.3 % (ref 20–42)
MCH RBC QN AUTO: 28.8 PG (ref 26–35)
MCHC RBC AUTO-ENTMCNC: 33.3 % (ref 32–34.5)
MCV RBC AUTO: 86.4 FL (ref 80–99.9)
MONOCYTES ABSOLUTE: 0.85 E9/L (ref 0.1–0.95)
MONOCYTES RELATIVE PERCENT: 14.5 % (ref 2–12)
NEUTROPHILS ABSOLUTE: 4.39 E9/L (ref 1.8–7.3)
NEUTROPHILS RELATIVE PERCENT: 74.7 % (ref 43–80)
PDW BLD-RTO: 12.3 FL (ref 11.5–15)
PLATELET # BLD: 163 E9/L (ref 130–450)
PMV BLD AUTO: 8.4 FL (ref 7–12)
POTASSIUM SERPL-SCNC: 4.1 MMOL/L (ref 3.5–5)
POTASSIUM SERPL-SCNC: 4.5 MMOL/L (ref 3.5–5)
POTASSIUM SERPL-SCNC: 4.6 MMOL/L (ref 3.5–5)
RBC # BLD: 3.54 E12/L (ref 3.8–5.8)
SODIUM BLD-SCNC: 128 MMOL/L (ref 132–146)
SODIUM BLD-SCNC: 128 MMOL/L (ref 132–146)
SODIUM BLD-SCNC: 130 MMOL/L (ref 132–146)
WBC # BLD: 5.9 E9/L (ref 4.5–11.5)

## 2021-08-15 PROCEDURE — 36415 COLL VENOUS BLD VENIPUNCTURE: CPT

## 2021-08-15 PROCEDURE — 85025 COMPLETE CBC W/AUTO DIFF WBC: CPT

## 2021-08-15 PROCEDURE — 6370000000 HC RX 637 (ALT 250 FOR IP): Performed by: NURSE PRACTITIONER

## 2021-08-15 PROCEDURE — 6370000000 HC RX 637 (ALT 250 FOR IP): Performed by: INTERNAL MEDICINE

## 2021-08-15 PROCEDURE — 93005 ELECTROCARDIOGRAM TRACING: CPT

## 2021-08-15 PROCEDURE — 80048 BASIC METABOLIC PNL TOTAL CA: CPT

## 2021-08-15 PROCEDURE — 2580000003 HC RX 258: Performed by: INTERNAL MEDICINE

## 2021-08-15 RX ORDER — SODIUM CHLORIDE 1000 MG
1 TABLET, SOLUBLE MISCELLANEOUS DAILY
Qty: 90 TABLET | Refills: 3 | Status: SHIPPED | OUTPATIENT
Start: 2021-08-15 | End: 2021-08-15 | Stop reason: SDUPTHER

## 2021-08-15 RX ORDER — DILTIAZEM HYDROCHLORIDE 120 MG/1
240 CAPSULE, COATED, EXTENDED RELEASE ORAL DAILY
Qty: 30 CAPSULE | Refills: 3 | Status: SHIPPED | OUTPATIENT
Start: 2021-08-15

## 2021-08-15 RX ORDER — SODIUM CHLORIDE 1000 MG
1 TABLET, SOLUBLE MISCELLANEOUS DAILY
Qty: 90 TABLET | Refills: 3 | Status: SHIPPED | OUTPATIENT
Start: 2021-08-15

## 2021-08-15 RX ORDER — DIGOXIN 0.25 MG/ML
125 INJECTION INTRAMUSCULAR; INTRAVENOUS ONCE
Status: DISCONTINUED | OUTPATIENT
Start: 2021-08-15 | End: 2021-08-15

## 2021-08-15 RX ORDER — DOXAZOSIN MESYLATE 4 MG/1
4 TABLET ORAL DAILY
Qty: 30 TABLET | Refills: 3 | Status: SHIPPED | OUTPATIENT
Start: 2021-08-16

## 2021-08-15 RX ORDER — POLYVINYL ALCOHOL 14 MG/ML
1 SOLUTION/ DROPS OPHTHALMIC PRN
Status: DISCONTINUED | OUTPATIENT
Start: 2021-08-15 | End: 2021-08-15 | Stop reason: HOSPADM

## 2021-08-15 RX ADMIN — Medication 1 G: at 13:16

## 2021-08-15 RX ADMIN — Medication 1 G: at 09:28

## 2021-08-15 RX ADMIN — FAMOTIDINE 20 MG: 20 TABLET ORAL at 09:26

## 2021-08-15 RX ADMIN — APIXABAN 5 MG: 5 TABLET, FILM COATED ORAL at 20:00

## 2021-08-15 RX ADMIN — Medication 1 G: at 18:26

## 2021-08-15 RX ADMIN — MAGNESIUM CITRATE 296 ML: 1.75 LIQUID ORAL at 13:16

## 2021-08-15 RX ADMIN — TAMSULOSIN HYDROCHLORIDE 0.4 MG: 0.4 CAPSULE ORAL at 09:26

## 2021-08-15 RX ADMIN — FLECAINIDE ACETATE 150 MG: 100 TABLET ORAL at 20:00

## 2021-08-15 RX ADMIN — POLYVINYL ALCOHOL 1 DROP: 14 SOLUTION/ DROPS OPHTHALMIC at 13:16

## 2021-08-15 RX ADMIN — DILTIAZEM HYDROCHLORIDE 120 MG: 120 CAPSULE, COATED, EXTENDED RELEASE ORAL at 09:26

## 2021-08-15 RX ADMIN — ATORVASTATIN CALCIUM 10 MG: 10 TABLET, FILM COATED ORAL at 09:26

## 2021-08-15 RX ADMIN — DOXAZOSIN 4 MG: 4 TABLET ORAL at 09:26

## 2021-08-15 RX ADMIN — APIXABAN 5 MG: 5 TABLET, FILM COATED ORAL at 09:27

## 2021-08-15 RX ADMIN — SODIUM CHLORIDE, PRESERVATIVE FREE 10 ML: 5 INJECTION INTRAVENOUS at 09:28

## 2021-08-15 RX ADMIN — FLECAINIDE ACETATE 150 MG: 100 TABLET ORAL at 09:26

## 2021-08-15 ASSESSMENT — PAIN SCALES - GENERAL: PAINLEVEL_OUTOF10: 0

## 2021-08-15 NOTE — DISCHARGE SUMMARY
Physician Discharge Summary     Patient ID:  Anil Nguyen  [id number]  56 y.o.  1934    Admit date: 8/9/2021    Discharge date and time: 8/15/2021    Admission Diagnoses:   Patient Active Problem List   Diagnosis    Hyponatremia       Discharge Diagnoses: hyponatremia     Consults: renal     Procedures: none     Hospital Course:    Admitted fro hyponatremia    Fluid restriction to 1 l , salt tabs 1 g daily   Na 130 at dc   Having tough time tolerating no water or fluids   Check BORIS covi 2 - as family has tested positive and they have been visiting - negative    Nephrology following  Cardura addedd and Cardizem increased for better bp control   Follow up with pcp in 3 days to repeat bmp    Ok for dc from medicine if ok with renal - will need to address his fluids intake issues at home   Recent Labs     08/13/21  0300 08/14/21  0545 08/15/21  0235   WBC 6.6 6.4 5.9   HGB 10.3* 9.6* 10.2*   HCT 29.8* 28.3* 30.6*    163 163       Recent Labs     08/15/21  0235 08/15/21  0915 08/15/21  1555   * 128* 130*   K 4.1 4.5 4.6   CL 97* 96* 96*   CO2 23 24 24   BUN 24* 23 24*   CREATININE 1.4* 1.4* 1.5*   CALCIUM 8.2* 8.6 8.8       No results found. Discharge Exam:    HEENT: NCAT,  PERRLA, No JVD  Heart:  RRR, no murmurs, gallops, or rubs.   Lungs:  CTA bilaterally, no wheeze, rales or rhonchi  Abd: bowel sounds present, nontender, nondistended, no masses  Extrem:  No clubbing, cyanosis, or edema    Disposition: home     Patient Condition at Discharge: stable     Patient Instructions:      Medication List      START taking these medications    doxazosin 4 MG tablet  Commonly known as: CARDURA  Take 1 tablet by mouth daily  Start taking on: August 16, 2021     sodium chloride 1 g tablet  Take 1 tablet by mouth daily        CHANGE how you take these medications    dilTIAZem 120 MG extended release capsule  Commonly known as: Cardizem CD  Take 2 capsules by mouth daily  What changed: how much to take

## 2021-08-15 NOTE — PROGRESS NOTES
Subjective: The patient is awake and alert, sitting in chair at bedside     No acute events overnight. Denies chest pain, angina, SOB     Objective:    BP (!) 148/80   Pulse 77   Temp 98.4 °F (36.9 °C) (Oral)   Resp 18   Ht 6' (1.829 m)   Wt 212 lb 4.8 oz (96.3 kg)   SpO2 96%   BMI 28.79 kg/m²     In: 380 [P.O.:380]  Out: 1000   In: 380   Out: 1000 [Urine:1000]    General appearance: NAD, conversant  HEENT: AT/NC, MMM  Neck: FROM, supple  Lungs: Clear to auscultation  CV: A-paced, no MRGs  Vasc: Radial pulses 2+  Abdomen: Soft, non-tender; no masses or HSM  Extremities: No peripheral edema or digital cyanosis  Skin: no rash, lesions or ulcers  Psych: Alert and oriented to person, place and time  Neuro: Alert and interactive     Recent Labs     08/12/21  0435 08/13/21  0300 08/14/21  0545   WBC 7.9 6.6 6.4   HGB 11.5* 10.3* 9.6*   HCT 33.7* 29.8* 28.3*    160 163       Recent Labs     08/14/21  0100 08/14/21  1135 08/14/21  1450   * 124* 127*   K 4.7 4.8 5.0   CL 91* 90* 93*   CO2 26 26 25   BUN 23 22 24*   CREATININE 1.5* 1.4* 1.6*   CALCIUM 8.9 9.0 8.7       Assessment:    Active Problems:    Hyponatremia  Resolved Problems:    * No resolved hospital problems.  *      Plan:    Fluid restriction  Strict I/Os  Monitor labs,  Neurology following--CT head with chronic findings  Supportive care  Having tough time tolerating no water or fluids   Check BORIS covi 2 - as family has tested positive and they have been visiting - negative   Better na today at 80    Nephrology following    Dc plan when na stabilizes and ok w renal     Angelique Hahn MD  9:57 PM  8/14/2021

## 2021-08-15 NOTE — PROGRESS NOTES
Department of Internal Medicine  Nephrology Consult Note      SUBJECTIVE:  We are following Mr. Aaron Núñez for hyponatremia and hypertension. Patient is voicing frustration over fluid restriction.     PHYSICAL EXAM:      Vitals:    VITALS:  BP (!) 166/88   Pulse 74   Temp 98.1 °F (36.7 °C) (Oral)   Resp 18   Ht 6' (1.829 m)   Wt 212 lb (96.2 kg)   SpO2 97%   BMI 28.75 kg/m²   24HR INTAKE/OUTPUT:      Intake/Output Summary (Last 24 hours) at 8/15/2021 1121  Last data filed at 8/14/2021 2257  Gross per 24 hour   Intake 320 ml   Output 300 ml   Net 20 ml       Constitutional:  Alert and oriented, NAD  HEENT:  Normocephalic, PERRL  Respiratory:  CTA bilaterally  Cardiovascular/Edema:  RRR, S1,S2   Gastrointestinal:  Soft, rounded, nontender, nondistended  Neurologic:  Nonfocal, SILVA  Skin:  Warm, dry, ecchymosis BUE  Other:  No edema   Scheduled Meds:   sodium chloride  1 g Oral TID WC    doxazosin  4 mg Oral Daily    apixaban  5 mg Oral BID    atorvastatin  10 mg Oral Daily    dilTIAZem  120 mg Oral Daily    famotidine  20 mg Oral Daily    flecainide  150 mg Oral BID    tamsulosin  0.4 mg Oral Daily    sodium chloride flush  5-40 mL Intravenous 2 times per day     Continuous Infusions:   sodium chloride       PRN Meds:.hydrALAZINE, labetalol, sodium chloride flush, sodium chloride, ondansetron **OR** ondansetron, polyethylene glycol, acetaminophen **OR** acetaminophen    DATA:    CBC:   Lab Results   Component Value Date    WBC 5.9 08/15/2021    RBC 3.54 08/15/2021    HGB 10.2 08/15/2021    HCT 30.6 08/15/2021    MCV 86.4 08/15/2021    MCH 28.8 08/15/2021    MCHC 33.3 08/15/2021    RDW 12.3 08/15/2021     08/15/2021    MPV 8.4 08/15/2021     CMP:    Lab Results   Component Value Date     08/15/2021    K 4.5 08/15/2021    K 4.3 08/10/2021    CL 96 08/15/2021    CO2 24 08/15/2021    BUN 23 08/15/2021    CREATININE 1.4 08/15/2021    GFRAA 58 08/15/2021    LABGLOM 48 08/15/2021    GLUCOSE 106 08/15/2021    PROT 6.2 08/11/2021    LABALBU 3.9 08/11/2021    CALCIUM 8.6 08/15/2021    BILITOT 0.5 08/11/2021    ALKPHOS 69 08/11/2021    AST 17 08/11/2021    ALT 11 08/11/2021     Magnesium:  No results found for: MG  Phosphorus:    Lab Results   Component Value Date    PHOS 3.7 11/03/2015     Radiology Review:      CT head without IV contrast 8/11/21   Somewhat degraded study by the patient's motion.  The for, the possibility of   acute intracranial hemorrhage cannot be entirely excluded.       Chronic nonemergent findings as above. BRIEF SUMMARY OF INITIAL CONSULT:    Briefly, Mr. Rigo Montanez is an 80year old male with a PMH of CKD stage IIIa, without proteinuria, baseline creatinine 1.4-1.5 mg/dL, secondary to nephrosclerosis, HTN, BPH, PAF on chronic anticoagulation on apixaban, s/p pacemaker, who originally presented to [facility] on August 9, 2021 with altered mental status, frequent falls, and dizziness which has been ongoing for the past few weeks. He was found to be hyponatremic with a sodium level of 121. A CT of the head was obtained there which revealed a questionable basal ganglia CVA. He was subsequently transferred to [facility] on August 9th due to bed availability. A CT of the head was obtained once he was transferred which did not show a CVA. His sodium level had increased to 128 and he was started on D5W to avoid rapid correction, dropping his sodium to 124. D5W was stopped and his sodium level has been stable at 127 ever since. We are consulted for management of hyponatremia. He denies any nausea, vomiting, or diarrhea. He states he eats three meals a day and has been drinking about 5-6 glasses of water a day. He also admits to nocturia with having to get up to urinate about 5-6 times a night. IMPRESSION/RECOMMENDATIONS:      1. Hypotonic hyponatremia, likely secondary to lack of water excretion and increased free water intake. Patient has high urine osmolality.  This could

## 2021-08-15 NOTE — PROGRESS NOTES
Subjective: The patient is awake and alert, sitting in chair at bedside     No acute events overnight. Denies chest pain, angina, SOB   Anxious for dc home     Objective:    BP (!) 166/88   Pulse 74   Temp 98.1 °F (36.7 °C) (Oral)   Resp 18   Ht 6' (1.829 m)   Wt 212 lb (96.2 kg)   SpO2 97%   BMI 28.75 kg/m²     In: 220 [P.O.:220]  Out: 150   In: 220   Out: 150 [Urine:150]    General appearance: NAD, conversant  HEENT: AT/NC, MMM  Neck: FROM, supple  Lungs: Clear to auscultation  CV: A-paced, no MRGs  Vasc: Radial pulses 2+  Abdomen: Soft, non-tender; no masses or HSM  Extremities: No peripheral edema or digital cyanosis  Skin: no rash, lesions or ulcers  Psych: Alert and oriented to person, place and time  Neuro: Alert and interactive     Recent Labs     08/13/21  0300 08/14/21  0545 08/15/21  0235   WBC 6.6 6.4 5.9   HGB 10.3* 9.6* 10.2*   HCT 29.8* 28.3* 30.6*    163 163       Recent Labs     08/14/21  1450 08/15/21  0235 08/15/21  0915   * 128* 128*   K 5.0 4.1 4.5   CL 93* 97* 96*   CO2 25 23 24   BUN 24* 24* 23   CREATININE 1.6* 1.4* 1.4*   CALCIUM 8.7 8.2* 8.6       Assessment:    Active Problems:    Hyponatremia  Resolved Problems:    * No resolved hospital problems.  *      Plan:    Fluid restriction, salt tabs   Strict I/Os  Monitor labs,  Neurology following--CT head with chronic findings  Supportive care  Having tough time tolerating no water or fluids   Check BORIS covi 2 - as family has tested positive and they have been visiting - negative   Better na today at 128   Nephrology following    [id number] [address] for dc from medicine if ok with renal - will need to address his fluids intake issues at home     Mimi Fierro MD  11:54 AM  8/15/2021

## 2021-08-15 NOTE — DISCHARGE INSTR - DIET

## 2021-08-16 ENCOUNTER — CARE COORDINATION (OUTPATIENT)
Dept: CARE COORDINATION | Age: 86
End: 2021-08-16

## 2021-08-16 LAB
EKG ATRIAL RATE: 77 BPM
EKG P-R INTERVAL: 264 MS
EKG Q-T INTERVAL: 486 MS
EKG QRS DURATION: 162 MS
EKG QTC CALCULATION (BAZETT): 549 MS
EKG R AXIS: 116 DEGREES
EKG T AXIS: 11 DEGREES
EKG VENTRICULAR RATE: 77 BPM

## 2021-08-17 ENCOUNTER — CARE COORDINATION (OUTPATIENT)
Dept: CARE COORDINATION | Age: 86
End: 2021-08-17

## 2021-08-17 NOTE — CARE COORDINATION
[facility] Transitions Initial Follow Up Call    Call within 2 business days of discharge: Yes    Patient: Edson Umana Patient : 1934   MRN: <[id number]>  Reason for Admission: -8/15/21 Hyponatremia  Discharge Date: 8/15/21 RARS: Readmission Risk Score: 15      Last Discharge [facility]       Complaint Diagnosis Description Type Department Provider    21  Hyponatremia Admission (Discharged) Aletha Irizarry MD           Spoke with: Patient    Pedro Duran reports weakness and constipation. Pt had a small BM today. Pt taking MiraLax, but concerned d/t fluid restrictions that this is the best choice. Advised to take an OTC stool softener (Colace/Senna). Pt v/u. Pt verbalizes frustration with fluid restrictions. Discussed why restrictions were in place, s/s to report to PCP/Nephrology and when to call 911. Pt V/U. Pt stated that Pt's daughter is in contact with PCP/Nephrologist office for medication management. Pt stated that he was d/c on Sodium Chloride 1G daily, but he was getting it TID in the hospital. Daughter called Dr Gui Corcoran for clarification. Pt is to take Sodium Chloride 1G TID. Noted change in Medication Review. Discussed falls precautions d/t weakness. Pt is using cane to ambulate. Does not have walker, shower chair and is refusing referral for script be sent to PCP. Pt to call for PCP f/u appt. Denies Transportation, Home, or Medication needs. Facility: [facility]    Non-face-to-face services provided:  Obtained and reviewed discharge summary and/or continuity of care documents    Transitions of Care Initial Call    Was this an external facility discharge? No Discharge Facility:     Challenges to be reviewed by the provider   Additional needs identified to be addressed with provider: No  none       Method of communication with provider : none      Advance Care Planning:   Does patient have an Advance Directive: not on file. Was this a readmission?  No  Patient stated reason for admission: falls  Patients top risk factors for readmission: medical condition-Hyponatremia, CKD    Care Transition Nurse (CTN) contacted the patient by telephone to perform post hospital discharge assessment. Verified name and  with patient as identifiers. Provided introduction to self, and explanation of the CTN role. CTN reviewed discharge instructions, medical action plan and red flags with patient who verbalized understanding. Patient given an opportunity to ask questions and does not have any further questions or concerns at this time. Were discharge instructions available to patient? Yes. Reviewed appropriate site of care based on symptoms and resources available to patient including: PCP, Specialist and When to call 911. The patient agrees to contact the PCP office for questions related to their healthcare. Medication reconciliation was performed with patient, who verbalizes understanding of administration of home medications. Advised obtaining a 90-day supply of all daily and as-needed medications. Covid Risk Education     Educated patient about risk for severe COVID-19 due to risk factors according to CDC guidelines. LPN CC reviewed discharge instructions, medical action plan and red flag symptoms with the patient who verbalized understanding. Discussed COVID vaccination status: Yes. Education provided on COVID-19 vaccination as appropriate. Discussed exposure protocols and quarantine with CDC Guidelines. Patient was given an opportunity to verbalize any questions and concerns and agrees to contact LPN CC or health care provider for questions related to their healthcare. Reviewed and educated patient on any new and changed medications related to discharge diagnosis. Was patient discharged with a pulse oximeter? No Discussed and confirmed pulse oximeter discharge instructions and when to notify provider or seek emergency care. LPN CC provided contact information.  Plan for follow-up call in 5-7 days based on severity of symptoms and risk factors. Plan for next call: symptom management-fluid restrictions, hyponatremia, constipation, weakness and follow up appointment-PCP f/u appt made? Care Transitions 24 Hour Call    Schedule Follow Up Appointment with PCP: Completed  Do you have any ongoing symptoms?: Yes  Patient-reported symptoms: Constipation, Weakness  Do you have a copy of your discharge instructions?: Yes  Do you have all of your prescriptions and are they filled?: Yes  Have you been contacted by a [id number] [employer] Pharmacist?: No  Have you scheduled your follow up appointment?: Yes  How are you going to get to your appointment?: Car - family or friend to transport  Were you discharged with any Home Care or Post Acute Services: No  Patient DME: Straight cane  Do you have support at home?: Partner/Spouse/SO  Do you feel like you have everything you need to keep you well at home?: Yes  Are you an active caregiver in your home?: No  Care Transitions Interventions  No Identified Needs         Follow Up  No future appointments.     Juan Aiken LPN

## 2021-08-24 ENCOUNTER — CARE COORDINATION (OUTPATIENT)
Dept: CARE COORDINATION | Age: 86
End: 2021-08-24

## 2021-08-24 NOTE — CARE COORDINATION
[facility] Transitions Follow Up Call    2021    Patient: Tanya Cedillo  Patient : 1934   MRN: <[id number]>  Reason for Admission: -8/15/21 Hyponatremia  Discharge Date: 8/15/21 RARS: Readmission Risk Score: 15         Spoke with: Attempted to contact patient for follow up Care Transition call. Unable to leave HIPPA compliant message. No answer, no voicemail. Will attempt to reach again. Care Transitions Subsequent and Final Call    Subsequent and Final Calls  Care Transitions Interventions  Other Interventions: Follow Up  No future appointments.     Criselda Corcoran LPN

## 2021-08-30 NOTE — PROGRESS NOTES
Physician Progress Note      PATIENT:               Bernadette Regan  CSN #:                  [id number]  :                       1934  ADMIT DATE:       2021 11:07 PM  [address] DATE:        8/15/2021 8:59 PM  RESPONDING  PROVIDER #:        KIESHA Noguera MD          QUERY TEXT:    Pt admitted with hyponatremia. 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 If possible, please document in progress notes and discharge   summary:    The medical record reflects the following:  Risk Factors: Increased free water intake  Clinical Indicators: Na was 128 on admission, fluctuated up and down, 130 at   discharge. Per  and 8/15 renal PN's \"Hypotonic hyponatremia, likely   secondary to lack of water excretion and increased free water intake. Patient   has high urine osmolality. This could be SIADH, transient, Acute or acute on   chronic. \" Osmo urine 168, osmo serum 266  Treatment: Fluid restriction, strict I/O, sodium chloride tabs, q6hr BMP    Thank you,  Carin Duarte RN, CCDS  Clinical Documentation Improvement Specialist  [email or web address]  [phone]  Options provided:  -- SIADH confirmed present on admission  -- SIADH confirmed not present on admission  -- SIADH ruled out  -- Other - I will add my own diagnosis  -- Disagree - Not applicable / Not valid  -- Disagree - Clinically unable to determine / Unknown  -- Refer to Clinical Documentation Reviewer    PROVIDER RESPONSE TEXT:    The diagnosis of [address] was confirmed as present on admission. Query created by: Car Sotomayor on 2021 10:35 AM      QUERY TEXT:    Patient admitted with hyponatremia, noted to have paroxysmal atrial   fibrillation and is maintained on Eliquis. If possible, please document in   progress notes and discharge summary if you are evaluating and/or treating any   of the following:     The medical record reflects the following:  Risk Factors: HTN, Afib, advanced age  Clinical Indicators: PAF on Eliquis with h/o HTN, Afib, advanced age. [id number] score of 3. Treatment: cont Eliquis    Thank you,  Manuel Peña RN, CCDS  Clinical Documentation Improvement Specialist  [email or web address]  [phone]  Options provided:  -- Secondary hypercoagulable state in a patient with atrial fibrillation  -- Eliquis is prophylactic treatment only without secondary hypercoagulable   state  -- Other - I will add my own diagnosis  -- Disagree - Not applicable / Not valid  -- Disagree - Clinically unable to determine / Unknown  -- Refer to Clinical Documentation Reviewer    PROVIDER RESPONSE TEXT:    This patient has secondary hypercoagulable state related to atrial   fibrillation.     Query created by: Ashley Ricketts on 8/24/2021 7:59 AM      Electronically signed by:  KIESHA Rain MD 8/30/2021 8:39 AM

## 2021-08-31 ENCOUNTER — CARE COORDINATION (OUTPATIENT)
Dept: CARE COORDINATION | Age: 86
End: 2021-08-31

## 2021-08-31 NOTE — CARE COORDINATION
[facility] Transitions Follow Up Call    2021    Patient: Pearl Snyder  Patient : 1934   MRN: <[id number]>  Reason for Admission: -8/15/21 Hyponatremia  Discharge Date: 8/15/21 RARS: Readmission Risk Score: 15         Spoke with: Second attempt made to contact patient for follow up Care Transition call. Unable to leave HIPPA compliant message. No answer, no voicemail. CTN will sign off. JB Newton / [id number]  [phone]      Care Transitions Subsequent and Final Call    Subsequent and Final Calls  Care Transitions Interventions  Other Interventions: Follow Up  No future appointments.     Siria Quijano LPN

## 2023-05-04 LAB
ANION GAP SERPL CALCULATED.3IONS-SCNC: 8 MEQ/L (ref 3–11)
BUN BLDV-MCNC: 16 MG/DL (ref 6–20)
CALCIUM SERPL-MCNC: 8.4 MG/DL (ref 8.5–10.5)
CHLORIDE BLD-SCNC: 90 MEQ/L (ref 98–107)
CHLORIDE URINE: 77 MEQ/L
CO2: 25 MEQ/L (ref 21–31)
CREAT SERPL-MCNC: 1.1 MG/DL (ref 0.6–1.3)
CREATININE + EGFR PANEL: 76 ML/MIN
GFR NON-AFRICAN AMERICAN: 63 ML/MIN
GLUCOSE BLD-MCNC: 103 MG/DL (ref 70–99)
OSMOLALITY URINE: 369 MMOL/L (ref 500–800)
POTASSIUM SERPL-SCNC: 4.2 MEQ/L (ref 3.6–5)
POTASSIUM, RANDOM URINE: 23.8 MEQ/L
SODIUM BLD-SCNC: 123 MEQ/L (ref 135–145)
SODIUM URINE: 79 MEQ/L

## 2024-01-01 ENCOUNTER — APPOINTMENT (OUTPATIENT)
Dept: GENERAL RADIOLOGY | Age: 89
End: 2024-01-01
Payer: MEDICARE

## 2024-01-01 ENCOUNTER — APPOINTMENT (OUTPATIENT)
Dept: CT IMAGING | Age: 89
End: 2024-01-01
Payer: MEDICARE

## 2024-01-01 ENCOUNTER — HOSPITAL ENCOUNTER (EMERGENCY)
Age: 89
End: 2024-01-24
Attending: EMERGENCY MEDICINE
Payer: MEDICARE

## 2024-01-01 VITALS
DIASTOLIC BLOOD PRESSURE: 55 MMHG | HEART RATE: 70 BPM | BODY MASS INDEX: 29.03 KG/M2 | OXYGEN SATURATION: 100 % | WEIGHT: 214.07 LBS | RESPIRATION RATE: 22 BRPM | SYSTOLIC BLOOD PRESSURE: 118 MMHG

## 2024-01-01 DIAGNOSIS — I61.9 HEMORRHAGIC STROKE (HCC): Primary | ICD-10-CM

## 2024-01-01 LAB
ALBUMIN SERPL-MCNC: 4.1 G/DL (ref 3.5–5.2)
ALP SERPL-CCNC: 97 U/L (ref 40–129)
ALT SERPL-CCNC: 9 U/L (ref 0–40)
ANION GAP SERPL CALCULATED.3IONS-SCNC: 10 MMOL/L (ref 7–16)
AST SERPL-CCNC: 19 U/L (ref 0–39)
BASOPHILS # BLD: 0.02 K/UL (ref 0–0.2)
BASOPHILS NFR BLD: 0 % (ref 0–2)
BILIRUB SERPL-MCNC: 0.3 MG/DL (ref 0–1.2)
BUN SERPL-MCNC: 34 MG/DL (ref 6–23)
CALCIUM SERPL-MCNC: 8.6 MG/DL (ref 8.6–10.2)
CHLORIDE SERPL-SCNC: 105 MMOL/L (ref 98–107)
CO2 SERPL-SCNC: 22 MMOL/L (ref 22–29)
CREAT SERPL-MCNC: 1.9 MG/DL (ref 0.7–1.2)
EOSINOPHIL # BLD: 0.25 K/UL (ref 0.05–0.5)
EOSINOPHILS RELATIVE PERCENT: 4 % (ref 0–6)
ERYTHROCYTE [DISTWIDTH] IN BLOOD BY AUTOMATED COUNT: 12.9 % (ref 11.5–15)
GFR SERPL CREATININE-BSD FRML MDRD: 34 ML/MIN/1.73M2
GLUCOSE BLD-MCNC: 163 MG/DL (ref 74–99)
GLUCOSE SERPL-MCNC: 177 MG/DL (ref 74–99)
HCT VFR BLD AUTO: 38.4 % (ref 37–54)
HGB BLD-MCNC: 12 G/DL (ref 12.5–16.5)
IMM GRANULOCYTES # BLD AUTO: 0.03 K/UL (ref 0–0.58)
IMM GRANULOCYTES NFR BLD: 1 % (ref 0–5)
INR PPP: 1.1
LYMPHOCYTES NFR BLD: 1.01 K/UL (ref 1.5–4)
LYMPHOCYTES RELATIVE PERCENT: 16 % (ref 20–42)
MCH RBC QN AUTO: 30 PG (ref 26–35)
MCHC RBC AUTO-ENTMCNC: 31.3 G/DL (ref 32–34.5)
MCV RBC AUTO: 96 FL (ref 80–99.9)
MONOCYTES NFR BLD: 0.67 K/UL (ref 0.1–0.95)
MONOCYTES NFR BLD: 10 % (ref 2–12)
NEUTROPHILS NFR BLD: 69 % (ref 43–80)
NEUTS SEG NFR BLD: 4.48 K/UL (ref 1.8–7.3)
PARTIAL THROMBOPLASTIN TIME: 25.2 SEC (ref 24.5–35.1)
PLATELET # BLD AUTO: 150 K/UL (ref 130–450)
PMV BLD AUTO: 9.6 FL (ref 7–12)
POTASSIUM SERPL-SCNC: 3.8 MMOL/L (ref 3.5–5)
PROT SERPL-MCNC: 6.7 G/DL (ref 6.4–8.3)
PROTHROMBIN TIME: 11.6 SEC (ref 9.3–12.4)
RBC # BLD AUTO: 4 M/UL (ref 3.8–5.8)
SODIUM SERPL-SCNC: 137 MMOL/L (ref 132–146)
TROPONIN I SERPL HS-MCNC: 35 NG/L (ref 0–11)
WBC OTHER # BLD: 6.5 K/UL (ref 4.5–11.5)

## 2024-01-01 PROCEDURE — 31500 INSERT EMERGENCY AIRWAY: CPT

## 2024-01-01 PROCEDURE — 96366 THER/PROPH/DIAG IV INF ADDON: CPT

## 2024-01-01 PROCEDURE — 85610 PROTHROMBIN TIME: CPT

## 2024-01-01 PROCEDURE — 82962 GLUCOSE BLOOD TEST: CPT

## 2024-01-01 PROCEDURE — 6360000002 HC RX W HCPCS

## 2024-01-01 PROCEDURE — 99284 EMERGENCY DEPT VISIT MOD MDM: CPT

## 2024-01-01 PROCEDURE — 96375 TX/PRO/DX INJ NEW DRUG ADDON: CPT

## 2024-01-01 PROCEDURE — 80053 COMPREHEN METABOLIC PANEL: CPT

## 2024-01-01 PROCEDURE — 85025 COMPLETE CBC W/AUTO DIFF WBC: CPT

## 2024-01-01 PROCEDURE — 71045 X-RAY EXAM CHEST 1 VIEW: CPT

## 2024-01-01 PROCEDURE — 85730 THROMBOPLASTIN TIME PARTIAL: CPT

## 2024-01-01 PROCEDURE — 2580000003 HC RX 258: Performed by: EMERGENCY MEDICINE

## 2024-01-01 PROCEDURE — 6360000002 HC RX W HCPCS: Performed by: EMERGENCY MEDICINE

## 2024-01-01 PROCEDURE — 96365 THER/PROPH/DIAG IV INF INIT: CPT

## 2024-01-01 PROCEDURE — 84484 ASSAY OF TROPONIN QUANT: CPT

## 2024-01-01 PROCEDURE — 2500000003 HC RX 250 WO HCPCS: Performed by: EMERGENCY MEDICINE

## 2024-01-01 PROCEDURE — 70450 CT HEAD/BRAIN W/O DYE: CPT

## 2024-01-01 RX ORDER — PROPOFOL 10 MG/ML
INJECTION, EMULSION INTRAVENOUS
Status: COMPLETED
Start: 2024-01-01 | End: 2024-01-01

## 2024-01-01 RX ORDER — PROPOFOL 10 MG/ML
5-50 INJECTION, EMULSION INTRAVENOUS CONTINUOUS
Status: DISCONTINUED | OUTPATIENT
Start: 2024-01-01 | End: 2024-01-01 | Stop reason: HOSPADM

## 2024-01-01 RX ORDER — SODIUM CHLORIDE 0.9 % (FLUSH) 0.9 %
10 SYRINGE (ML) INJECTION
Status: DISCONTINUED | OUTPATIENT
Start: 2024-01-01 | End: 2024-01-01 | Stop reason: HOSPADM

## 2024-01-01 RX ORDER — LORAZEPAM 2 MG/ML
2 INJECTION INTRAMUSCULAR ONCE
Status: COMPLETED | OUTPATIENT
Start: 2024-01-01 | End: 2024-01-01

## 2024-01-01 RX ORDER — SUCCINYLCHOLINE CHLORIDE 20 MG/ML
100 INJECTION INTRAMUSCULAR; INTRAVENOUS ONCE
Status: COMPLETED | OUTPATIENT
Start: 2024-01-01 | End: 2024-01-01

## 2024-01-01 RX ORDER — ETOMIDATE 2 MG/ML
20 INJECTION INTRAVENOUS ONCE
Status: COMPLETED | OUTPATIENT
Start: 2024-01-01 | End: 2024-01-01

## 2024-01-01 RX ADMIN — LORAZEPAM 2 MG: 2 INJECTION INTRAMUSCULAR; INTRAVENOUS at 23:18

## 2024-01-01 RX ADMIN — PROPOFOL 20 MCG/KG/MIN: 10 INJECTION, EMULSION INTRAVENOUS at 17:34

## 2024-01-01 RX ADMIN — SODIUM CHLORIDE 10 MG/HR: 9 INJECTION, SOLUTION INTRAVENOUS at 19:30

## 2024-01-01 RX ADMIN — SUCCINYLCHOLINE CHLORIDE 100 MG: 20 INJECTION, SOLUTION INTRAMUSCULAR; INTRAVENOUS at 18:09

## 2024-01-01 RX ADMIN — ETOMIDATE 20 MG: 2 INJECTION, SOLUTION INTRAVENOUS at 18:09

## 2024-01-01 RX ADMIN — SODIUM CHLORIDE 5 MG/HR: 9 INJECTION, SOLUTION INTRAVENOUS at 17:57

## 2024-01-01 ASSESSMENT — PULMONARY FUNCTION TESTS: PIF_VALUE: 27

## 2024-01-23 NOTE — ED PROVIDER NOTES
[facility] EMERGENCY DEPARTMENT  EMERGENCY DEPARTMENT ENCOUNTER        Pt Name: Zane Villalta  MRN: [id number]  Birthdate 6/3/1934  Date of evaluation: 1/23/2024  Provider: Celso Perez MD  PCP: Sebastian Espinosa DO  Note Started: 5:36 PM EST 1/23/24    CHIEF COMPLAINT       Chief Complaint   Patient presents with    Altered Mental Status     Pinpoint pupils, facial droop, unresponsive, bagging the pt per ems.        HISTORY OF PRESENT ILLNESS: 1 or more Elements   History From: EMS.    Limitations to history : None    Zane Villalta is a 89 y.o. male with a past medical history of hypertension, atrial fibrillation on Elliquis on the pacemaker who presents to the ED unresponsive brought in by EMS. His last well-known was approximately 1 and half hour ago. Upon arrival to ER, patient is somnolent and not responsive to any questions, not responsive to any painful stimuli.  GCS is less than 8.  Patient is hypertensive upon arrival to ER.  Patient does have a history of atrial fibrillation and is on Eliquis.    Nursing Notes were all reviewed and agreed with or any disagreements were addressed in the HPI.    ROS:   Pertinent positives and negatives are stated within HPI, all other systems reviewed and are negative.    --------------------------------------------- PAST HISTORY ---------------------------------------------  Past Medical History:  has a past medical history of Atrial fibrillation (HCC), Hypertension, and Pacemaker.    Past Surgical History:  has no past surgical history on file.    Social History:      Family History: family history is not on file.     The patient’s home medications have been reviewed.    Allergies: Patient has no known allergies.    ---------------------------------------------------PHYSICAL EXAM--------------------------------------      Constitutional/General: Unresponsive.  Head: Normocephalic and atraumatic  Mouth: Oropharynx clear, handling

## 2024-01-23 NOTE — DISCHARGE INSTR - COC
Continuity of Care Form    Patient Name: Zane Villalta   :  6/3/1934  MRN:  [id number]    Admit date:  2024  Discharge date:  ***    Code Status Order: Prior   Advance Directives:     Admitting Physician:  No admitting provider for patient encounter.  PCP: Nova Espinosa DO    Discharging Nurse: ***  Discharging Hospital Unit/Room#:   Discharging Unit Phone Number: ***    Emergency Contact:   Extended Emergency Contact Information  Primary Emergency Contact: irene kirkpatrick  Home Phone: [phone]  Relation: Child  Secondary Emergency Contact: nova adams  Relation: None    Past Surgical History:  No past surgical history on file.    Immunization History:   Immunization History   Administered Date(s) Administered    COVID-19, PFIZER PURPLE top, DILUTE for use, (age 12 y+), 30mcg/0.3mL 2021, 2021, 10/21/2021       Active Problems:  Patient Active Problem List   Diagnosis Code    Hyponatremia E87.1       Isolation/Infection:   Isolation            No Isolation          Patient Infection Status       None to display            Nurse Assessment:  Last Vital Signs: Wt 97.1 kg (214 lb 1.1 oz)   BMI 29.03 kg/m²     Last documented pain score (0-10 scale):    Last Weight:   Wt Readings from Last 1 Encounters:   24 97.1 kg (214 lb 1.1 oz)     Mental Status:  {IP PT MENTAL STATUS:}    IV Access:  { MAXIMILIANO IV ACCESS:667153154}    Nursing Mobility/ADLs:  Walking   {CHP DME ADLs:989528406}  Transfer  {CHP DME ADLs:068902633}  Bathing  {CHP DME ADLs:562519070}  Dressing  {CHP DME ADLs:798635825}  Toileting  {CHP DME ADLs:978434948}  Feeding  {CHP DME ADLs:883795328}  Med Admin  {CHP DME ADLs:094199820}  Med Delivery   { MAXIMILIANO MED Delivery:347877474}    Wound Care Documentation and Therapy:  Wound 21 Arm Right (Active)   Number of days: 893        Elimination:  Continence:   Bowel: {YES / NO:}  Bladder: {YES / NO:}  Urinary Catheter: {Urinary Catheter:033900364}

## 2024-01-23 NOTE — ED NOTES
Stroke/ Marsha Alert Time:stroke @ 1728      Time Neurologist called:Dr Teran @ 1732  :    Time CT Notified:       BRAIN alert time: (If applicable)

## 2024-01-24 NOTE — ED NOTES
Family took patient's blanket and requested the staff to dispose of clothing.  Patient's family has left bedside.

## 2024-01-24 NOTE — ED NOTES
[facility] notified, they will not be following patient and patient can be released to  home.  Reference [id number]